# Patient Record
Sex: FEMALE | Race: WHITE | NOT HISPANIC OR LATINO | Employment: OTHER | URBAN - METROPOLITAN AREA
[De-identification: names, ages, dates, MRNs, and addresses within clinical notes are randomized per-mention and may not be internally consistent; named-entity substitution may affect disease eponyms.]

---

## 2021-08-26 ENCOUNTER — OFFICE VISIT (OUTPATIENT)
Dept: URGENT CARE | Facility: PHYSICIAN GROUP | Age: 36
End: 2021-08-26
Payer: COMMERCIAL

## 2021-08-26 VITALS
DIASTOLIC BLOOD PRESSURE: 78 MMHG | WEIGHT: 150 LBS | OXYGEN SATURATION: 97 % | BODY MASS INDEX: 27.6 KG/M2 | SYSTOLIC BLOOD PRESSURE: 110 MMHG | RESPIRATION RATE: 18 BRPM | HEIGHT: 62 IN | TEMPERATURE: 98.4 F | HEART RATE: 82 BPM

## 2021-08-26 DIAGNOSIS — M54.6 ACUTE BILATERAL THORACIC BACK PAIN: ICD-10-CM

## 2021-08-26 DIAGNOSIS — S16.1XXA NECK STRAIN, INITIAL ENCOUNTER: ICD-10-CM
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
## 2021-08-26 PROCEDURE — 99203 OFFICE O/P NEW LOW 30 MIN: CPT | Performed by: PHYSICIAN ASSISTANT

## 2021-08-26 RX ORDER — IBUPROFEN 800 MG/1
800 TABLET ORAL EVERY 8 HOURS PRN
Qty: 30 TABLET | Refills: 0 | Status: SHIPPED | OUTPATIENT
Start: 2021-08-26

## 2021-08-26 RX ORDER — CYCLOBENZAPRINE HCL 10 MG
10 TABLET ORAL 3 TIMES DAILY PRN
Qty: 14 TABLET | Refills: 0 | Status: SHIPPED | OUTPATIENT
Start: 2021-08-26 | End: 2021-09-04 | Stop reason: SDUPTHER
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
## 2021-09-04 ENCOUNTER — APPOINTMENT (OUTPATIENT)
Dept: RADIOLOGY | Facility: IMAGING CENTER | Age: 36
End: 2021-09-04
Attending: PHYSICIAN ASSISTANT
Payer: COMMERCIAL

## 2021-09-04 ENCOUNTER — OFFICE VISIT (OUTPATIENT)
Dept: URGENT CARE | Facility: PHYSICIAN GROUP | Age: 36
End: 2021-09-04
Payer: COMMERCIAL

## 2021-09-04 VITALS
BODY MASS INDEX: 27.6 KG/M2 | DIASTOLIC BLOOD PRESSURE: 70 MMHG | HEIGHT: 62 IN | SYSTOLIC BLOOD PRESSURE: 118 MMHG | WEIGHT: 150 LBS | TEMPERATURE: 98.7 F | HEART RATE: 70 BPM | RESPIRATION RATE: 18 BRPM | OXYGEN SATURATION: 98 %

## 2021-09-04 DIAGNOSIS — M54.6 ACUTE BILATERAL THORACIC BACK PAIN: ICD-10-CM
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
## 2021-09-04 DIAGNOSIS — S16.1XXA NECK STRAIN, INITIAL ENCOUNTER: ICD-10-CM

## 2021-09-04 PROCEDURE — 99214 OFFICE O/P EST MOD 30 MIN: CPT | Mod: 25 | Performed by: PHYSICIAN ASSISTANT

## 2021-09-04 PROCEDURE — 72070 X-RAY EXAM THORAC SPINE 2VWS: CPT | Mod: TC | Performed by: PHYSICIAN ASSISTANT

## 2021-09-04 PROCEDURE — 99999 PR NO CHARGE: CPT | Performed by: PHYSICIAN ASSISTANT

## 2021-09-04 PROCEDURE — 72040 X-RAY EXAM NECK SPINE 2-3 VW: CPT | Mod: TC | Performed by: PHYSICIAN ASSISTANT

## 2021-09-04 PROCEDURE — 72100 X-RAY EXAM L-S SPINE 2/3 VWS: CPT | Mod: TC | Performed by: PHYSICIAN ASSISTANT

## 2021-09-04 RX ORDER — CYCLOBENZAPRINE HCL 10 MG
10 TABLET ORAL 3 TIMES DAILY PRN
Qty: 14 TABLET | Refills: 0 | Status: SHIPPED | OUTPATIENT
Start: 2021-09-04

## 2021-09-04 RX ORDER — KETOROLAC TROMETHAMINE 30 MG/ML
30 INJECTION, SOLUTION INTRAMUSCULAR; INTRAVENOUS ONCE
Status: COMPLETED | OUTPATIENT
Start: 2021-09-04 | End: 2021-09-04

## 2021-09-04 RX ADMIN — KETOROLAC TROMETHAMINE 30 MG: 30 INJECTION, SOLUTION INTRAMUSCULAR; INTRAVENOUS at 10:44

## 2021-09-04 ASSESSMENT — ENCOUNTER SYMPTOMS
DIARRHEA: 0
ABDOMINAL PAIN: 0
CONSTIPATION: 0
CHILLS: 0
FEVER: 0
MYALGIAS: 0
BACK PAIN: 1
NECK PAIN: 1
SHORTNESS OF BREATH: 0
NAUSEA: 0
EYE PAIN: 0
HEADACHES: 0
COUGH: 0
VOMITING: 0
SORE THROAT: 0
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
## 2021-09-09 ENCOUNTER — HOSPITAL ENCOUNTER (OUTPATIENT)
Facility: MEDICAL CENTER | Age: 36
End: 2021-09-09
Attending: OBSTETRICS & GYNECOLOGY
Payer: COMMERCIAL

## 2021-09-09 ENCOUNTER — GYNECOLOGY VISIT (OUTPATIENT)
Dept: OBGYN | Facility: CLINIC | Age: 36
End: 2021-09-09
Payer: COMMERCIAL

## 2021-09-09 VITALS — DIASTOLIC BLOOD PRESSURE: 105 MMHG | BODY MASS INDEX: 27.25 KG/M2 | WEIGHT: 149 LBS | SYSTOLIC BLOOD PRESSURE: 145 MMHG

## 2021-09-09 DIAGNOSIS — Z00.00 ANNUAL PHYSICAL EXAM: ICD-10-CM

## 2021-09-09 DIAGNOSIS — Z12.4 CERVICAL CANCER SCREENING: ICD-10-CM

## 2021-09-09 DIAGNOSIS — E28.2 PCOS (POLYCYSTIC OVARIAN SYNDROME): ICD-10-CM

## 2021-09-09 DIAGNOSIS — E28.8 HYPERANDROGENISM: ICD-10-CM

## 2021-09-09 PROCEDURE — 87624 HPV HI-RISK TYP POOLED RSLT: CPT

## 2021-09-09 PROCEDURE — 88175 CYTOPATH C/V AUTO FLUID REDO: CPT

## 2021-09-09 PROCEDURE — 99385 PREV VISIT NEW AGE 18-39: CPT | Performed by: OBSTETRICS & GYNECOLOGY

## 2021-09-09 RX ORDER — ETONOGESTREL AND ETHINYL ESTRADIOL VAGINAL RING .015; .12 MG/D; MG/D
RING VAGINAL
Qty: 3 EACH | Refills: 4 | Status: SHIPPED | OUTPATIENT
Start: 2021-09-09 | End: 2022-11-11
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
## 2021-09-13 LAB
CYTOLOGY REG CYTOL: NORMAL
HPV HR 12 DNA CVX QL NAA+PROBE: NEGATIVE
HPV16 DNA SPEC QL NAA+PROBE: NEGATIVE
HPV18 DNA SPEC QL NAA+PROBE: NEGATIVE
SPECIMEN SOURCE: NORMAL

## 2022-08-29 ENCOUNTER — OFFICE VISIT (OUTPATIENT)
Dept: URGENT CARE | Facility: PHYSICIAN GROUP | Age: 37
End: 2022-08-29
Payer: COMMERCIAL

## 2022-08-29 ENCOUNTER — APPOINTMENT (OUTPATIENT)
Dept: RADIOLOGY | Facility: IMAGING CENTER | Age: 37
End: 2022-08-29
Attending: PHYSICIAN ASSISTANT
Payer: COMMERCIAL

## 2022-08-29 VITALS
TEMPERATURE: 98.4 F | BODY MASS INDEX: 28.52 KG/M2 | HEART RATE: 113 BPM | DIASTOLIC BLOOD PRESSURE: 84 MMHG | HEIGHT: 62 IN | SYSTOLIC BLOOD PRESSURE: 122 MMHG | RESPIRATION RATE: 16 BRPM | WEIGHT: 155 LBS | OXYGEN SATURATION: 98 %

## 2022-08-29 DIAGNOSIS — R05.9 COUGH: ICD-10-CM

## 2022-08-29 DIAGNOSIS — R09.89 CHEST CONGESTION: ICD-10-CM

## 2022-08-29 PROCEDURE — 71046 X-RAY EXAM CHEST 2 VIEWS: CPT | Mod: TC | Performed by: PHYSICIAN ASSISTANT

## 2022-08-29 PROCEDURE — 99213 OFFICE O/P EST LOW 20 MIN: CPT | Performed by: PHYSICIAN ASSISTANT

## 2022-08-29 RX ORDER — INFLUENZA A VIRUS A/IDAHO/07/2018 (H1N1) ANTIGEN (MDCK CELL DERIVED, PROPIOLACTONE INACTIVATED, INFLUENZA A VIRUS A/INDIANA/08/2018 (H3N2) ANTIGEN (MDCK CELL DERIVED, PROPIOLACTONE INACTIVATED), INFLUENZA B VIRUS B/SINGAPORE/INFTT-16-0610/2016 ANTIGEN (MDCK CELL DERIVED, PROPIOLACTONE INACTIVATED), INFLUENZA B VIRUS B/IOWA/06/2017 ANTIGEN (MDCK CELL DERIVED, PROPIOLACTONE INACTIVATED) 15; 15; 15; 15 UG/.5ML; UG/.5ML; UG/.5ML; UG/.5ML
INJECTION, SUSPENSION INTRAMUSCULAR
COMMUNITY
Start: 2021-09-03

## 2022-08-29 RX ORDER — BENZONATATE 100 MG/1
100 CAPSULE ORAL 3 TIMES DAILY PRN
Qty: 60 CAPSULE | Refills: 0 | Status: SHIPPED | OUTPATIENT
Start: 2022-08-29

## 2022-08-29 RX ORDER — PREDNISONE 10 MG/1
40 TABLET ORAL 2 TIMES DAILY
Qty: 40 TABLET | Refills: 0 | Status: SHIPPED | OUTPATIENT
Start: 2022-08-29 | End: 2022-09-03

## 2022-08-29 ASSESSMENT — ENCOUNTER SYMPTOMS
SPUTUM PRODUCTION: 1
EYE REDNESS: 0
VOMITING: 0
HEADACHES: 0
SINUS PAIN: 0
FEVER: 0
DIARRHEA: 0
WHEEZING: 0
CONSTIPATION: 0
EYE DISCHARGE: 0
EYE PAIN: 0
DIZZINESS: 0
SORE THROAT: 0
ABDOMINAL PAIN: 0
DIAPHORESIS: 0
SHORTNESS OF BREATH: 1
CHILLS: 0
COUGH: 1
NAUSEA: 0

## 2022-08-29 NOTE — PROGRESS NOTES
"  Subjective:     Josephine Marie  is a 37 y.o. female who presents for Other (Burning sensation in lungs x3/4 days, cough, had COVID x2 weeks ago. )       She presents today with lingering chest congestion, cough, shortness of breath, sputum production x2 weeks.  She is also experiencing difficulties with sleeping due to her symptoms as well as a constant sternal chest pain that is worsened with cough.  She did test positive for COVID 2 weeks ago and symptoms have been lingering since that time.  Denies fever/chills/sweats, nausea/vomiting, abdominal pain, diarrhea.  Taking over-the-counter medications for symptoms.      Review of Systems   Constitutional:  Negative for chills, diaphoresis, fever and malaise/fatigue.   HENT:  Negative for congestion, ear discharge, sinus pain and sore throat.    Eyes:  Negative for pain, discharge and redness.   Respiratory:  Positive for cough, sputum production and shortness of breath. Negative for wheezing.    Cardiovascular:  Positive for chest pain.   Gastrointestinal:  Negative for abdominal pain, constipation, diarrhea, nausea and vomiting.   Genitourinary:  Negative for dysuria, frequency and urgency.   Neurological:  Negative for dizziness and headaches.    No Known Allergies  Past Medical History:   Diagnosis Date    Anxiety     Depression     Migraine         Objective:   /84 (BP Location: Right arm, Patient Position: Sitting, BP Cuff Size: Adult)   Pulse (!) 113   Temp 36.9 °C (98.4 °F) (Temporal)   Resp 16   Ht 1.575 m (5' 2\")   Wt 70.3 kg (155 lb)   SpO2 98%   BMI 28.35 kg/m²   Physical Exam  Vitals and nursing note reviewed.   Constitutional:       General: She is not in acute distress.     Appearance: Normal appearance. She is not ill-appearing, toxic-appearing or diaphoretic.   HENT:      Head: Normocephalic.      Right Ear: Tympanic membrane, ear canal and external ear normal. There is no impacted cerumen.      Left Ear: Tympanic membrane, ear canal " and external ear normal. There is no impacted cerumen.      Nose: No congestion or rhinorrhea.      Mouth/Throat:      Mouth: Mucous membranes are moist.      Pharynx: No oropharyngeal exudate or posterior oropharyngeal erythema.   Eyes:      General:         Right eye: No discharge.         Left eye: No discharge.      Conjunctiva/sclera: Conjunctivae normal.   Cardiovascular:      Rate and Rhythm: Normal rate and regular rhythm.   Pulmonary:      Effort: Pulmonary effort is normal. No respiratory distress.      Breath sounds: Normal breath sounds. No stridor. No wheezing or rhonchi.   Musculoskeletal:      Cervical back: Neck supple.   Lymphadenopathy:      Cervical: No cervical adenopathy.   Neurological:      General: No focal deficit present.      Mental Status: She is alert and oriented to person, place, and time.   Psychiatric:         Mood and Affect: Mood normal.         Behavior: Behavior normal.         Thought Content: Thought content normal.         Judgment: Judgment normal.           Diagnostic testing:    Chest x-ray  Radiologist IMPRESSION:  No evidence of acute cardiopulmonary disease      Assessment/Plan:     Encounter Diagnoses   Name Primary?    Cough     Chest congestion           Plan for care for today's complaint includes prednisone for symptom support.  She should continue to monitor symptoms follow-up with primary care provider return urgent care if symptoms remain ongoing.  Follow-up in the emergency department if symptoms become severe.  Chest x-ray was negative for signs of pulmonary pathology/pneumonia.  Prescription for prednisone provided.    See AVS Instructions below for written guidance provided to patient on after-visit management and care in addition to our verbal discussion during the visit.    Please note that this dictation was created using voice recognition software. I have attempted to correct all errors, but there may be sound-alike, spelling, grammar and possibly content  errors that I did not discover before finalizing the note.    [place] [employer] NG

## 2024-06-06 ENCOUNTER — RESEARCH ENCOUNTER (OUTPATIENT)
Dept: RESEARCH | Facility: MEDICAL CENTER | Age: 39
End: 2024-06-06
Payer: COMMERCIAL

## 2024-06-26 NOTE — RESEARCH NOTE
Declined participating in SHERRI Study at this time but is willing to do it if we continue in the future

## 2024-07-12 ENCOUNTER — APPOINTMENT (OUTPATIENT)
Dept: OBGYN | Facility: CLINIC | Age: 39
End: 2024-07-12
Payer: COMMERCIAL

## 2024-08-02 ENCOUNTER — INITIAL PRENATAL (OUTPATIENT)
Dept: OBGYN | Facility: CLINIC | Age: 39
End: 2024-08-02
Payer: COMMERCIAL

## 2024-08-02 ENCOUNTER — HOSPITAL ENCOUNTER (OUTPATIENT)
Dept: LAB | Facility: MEDICAL CENTER | Age: 39
End: 2024-08-02
Attending: NURSE PRACTITIONER
Payer: COMMERCIAL

## 2024-08-02 ENCOUNTER — HOSPITAL ENCOUNTER (OUTPATIENT)
Facility: MEDICAL CENTER | Age: 39
End: 2024-08-02
Attending: NURSE PRACTITIONER
Payer: COMMERCIAL

## 2024-08-02 VITALS — BODY MASS INDEX: 28.31 KG/M2 | SYSTOLIC BLOOD PRESSURE: 132 MMHG | WEIGHT: 154.8 LBS | DIASTOLIC BLOOD PRESSURE: 74 MMHG

## 2024-08-02 DIAGNOSIS — O09.522 MULTIGRAVIDA OF ADVANCED MATERNAL AGE IN SECOND TRIMESTER: ICD-10-CM

## 2024-08-02 DIAGNOSIS — O09.512 SUPERVISION OF ELDERLY PRIMIGRAVIDA, SECOND TRIMESTER: ICD-10-CM

## 2024-08-02 PROBLEM — Z87.42 HISTORY OF PCOS: Status: ACTIVE | Noted: 2024-08-02

## 2024-08-02 PROBLEM — Z87.09 HISTORY OF ASTHMA: Status: ACTIVE | Noted: 2024-08-02

## 2024-08-02 PROBLEM — Z86.59 HISTORY OF ANXIETY: Status: ACTIVE | Noted: 2024-08-02

## 2024-08-02 LAB
ABO GROUP BLD: NORMAL
BLD GP AB SCN SERPL QL: NORMAL
ERYTHROCYTE [DISTWIDTH] IN BLOOD BY AUTOMATED COUNT: 41.2 FL (ref 35.9–50)
HBV SURFACE AG SER QL: NORMAL
HCT VFR BLD AUTO: 43.8 % (ref 37–47)
HCV AB SER QL: NORMAL
HGB BLD-MCNC: 14.8 G/DL (ref 12–16)
HIV 1+2 AB+HIV1 P24 AG SERPL QL IA: NORMAL
MCH RBC QN AUTO: 30.2 PG (ref 27–33)
MCHC RBC AUTO-ENTMCNC: 33.8 G/DL (ref 32.2–35.5)
MCV RBC AUTO: 89.4 FL (ref 81.4–97.8)
PLATELET # BLD AUTO: 311 K/UL (ref 164–446)
PMV BLD AUTO: 10.7 FL (ref 9–12.9)
RBC # BLD AUTO: 4.9 M/UL (ref 4.2–5.4)
RH BLD: NORMAL
RUBV AB SER QL: 43.7 IU/ML
T PALLIDUM AB SER QL IA: NORMAL
WBC # BLD AUTO: 10.6 K/UL (ref 4.8–10.8)

## 2024-08-02 PROCEDURE — 3075F SYST BP GE 130 - 139MM HG: CPT | Performed by: NURSE PRACTITIONER

## 2024-08-02 PROCEDURE — 87480 CANDIDA DNA DIR PROBE: CPT

## 2024-08-02 PROCEDURE — 87510 GARDNER VAG DNA DIR PROBE: CPT

## 2024-08-02 PROCEDURE — 86803 HEPATITIS C AB TEST: CPT

## 2024-08-02 PROCEDURE — 86900 BLOOD TYPING SEROLOGIC ABO: CPT

## 2024-08-02 PROCEDURE — 87660 TRICHOMONAS VAGIN DIR PROBE: CPT

## 2024-08-02 PROCEDURE — 87086 URINE CULTURE/COLONY COUNT: CPT

## 2024-08-02 PROCEDURE — 86780 TREPONEMA PALLIDUM: CPT

## 2024-08-02 PROCEDURE — 3078F DIAST BP <80 MM HG: CPT | Performed by: NURSE PRACTITIONER

## 2024-08-02 PROCEDURE — 87389 HIV-1 AG W/HIV-1&-2 AB AG IA: CPT

## 2024-08-02 PROCEDURE — 87491 CHLMYD TRACH DNA AMP PROBE: CPT

## 2024-08-02 PROCEDURE — 86901 BLOOD TYPING SEROLOGIC RH(D): CPT

## 2024-08-02 PROCEDURE — 87340 HEPATITIS B SURFACE AG IA: CPT

## 2024-08-02 PROCEDURE — 86850 RBC ANTIBODY SCREEN: CPT

## 2024-08-02 PROCEDURE — 86762 RUBELLA ANTIBODY: CPT

## 2024-08-02 PROCEDURE — 87591 N.GONORRHOEAE DNA AMP PROB: CPT

## 2024-08-02 PROCEDURE — 80307 DRUG TEST PRSMV CHEM ANLYZR: CPT

## 2024-08-02 PROCEDURE — 85027 COMPLETE CBC AUTOMATED: CPT

## 2024-08-02 PROCEDURE — 36415 COLL VENOUS BLD VENIPUNCTURE: CPT

## 2024-08-02 PROCEDURE — 0500F INITIAL PRENATAL CARE VISIT: CPT | Performed by: NURSE PRACTITIONER

## 2024-08-02 RX ORDER — ONDANSETRON 4 MG/1
4 TABLET, ORALLY DISINTEGRATING ORAL EVERY 6 HOURS PRN
Qty: 30 TABLET | Refills: 3 | Status: SHIPPED | OUTPATIENT
Start: 2024-08-02

## 2024-08-02 NOTE — PROGRESS NOTES
Subjective:   Josephine Marie is a 39 y.o.  who presents for her new OB exam.  She is 13w1d with an SALLY of Estimated Date of Delivery: 25 by LMP she was tracking c/w US. She is feeling well, does have N/V on and vomiting. Denies VB, LOF, contractions or pain. No ER visits or previous care in this pregnancy. Denies dysuria, vaginal DC, fever. Reports  fetal movement. Desires AFP.  Declines CF.  Desires NIPT testing.     Past Medical History:   Diagnosis Date    Migraine        Psych Hx: Reports hx of anxiety and depression but currently stable. Patient denies any history of PTSD, bipolar or any other psychological issues.     EPDS completed    Past Surgical History:   Procedure Laterality Date    DENTAL SURGERY      wisdom        OB History    Para Term  AB Living   1 0 0 0 0 0   SAB IAB Ectopic Molar Multiple Live Births   0 0 0 0 0 0      # Outcome Date GA Lbr Rodo/2nd Weight Sex Delivery Anes PTL Lv   1 Current                 Gynecological Hx: Denies any hx of STIs, including HSV. Denies any vulvovaginal disorders and no hx of abnormal cervical cytology. Last pap  WNL.    Sexual Hx: One current male partner, who is FOB     Contraceptive Hx: Has used pills, patch, ring in the past and has since discontinued use.     Family History   Problem Relation Age of Onset    Cancer Mother         cervical    Heart Disease Father     No Known Problems Sister     Heart Disease Maternal Grandmother     Obesity Maternal Grandmother     Heart Disease Maternal Grandfather      Denies any genetic disorders in family history.     Social History     Socioeconomic History    Marital status:      Spouse name: Not on file    Number of children: Not on file    Years of education: Not on file    Highest education level: Some college, no degree   Occupational History    Not on file   Tobacco Use    Smoking status: Never    Smokeless tobacco: Never   Vaping Use    Vaping status: Never Used   Substance and  Sexual Activity    Alcohol use: Not Currently     Comment: occ    Drug use: Not Currently     Types: Marijuana     Comment: occ, gummies x 1    Sexual activity: Yes     Partners: Male     Birth control/protection: Inserts, Ring     Comment: 3/2022   Other Topics Concern    Not on file   Social History Narrative    Not on file     Social Determinants of Health     Financial Resource Strain: Low Risk  (8/28/2022)    Overall Financial Resource Strain (CARDIA)     Difficulty of Paying Living Expenses: Not hard at all   Food Insecurity: No Food Insecurity (8/28/2022)    Hunger Vital Sign     Worried About Running Out of Food in the Last Year: Never true     Ran Out of Food in the Last Year: Never true   Transportation Needs: No Transportation Needs (8/28/2022)    PRAPARE - Transportation     Lack of Transportation (Medical): No     Lack of Transportation (Non-Medical): No   Physical Activity: Insufficiently Active (8/28/2022)    Exercise Vital Sign     Days of Exercise per Week: 2 days     Minutes of Exercise per Session: 20 min   Stress: Stress Concern Present (8/28/2022)    [employer] [place] [employer] - Occupational Stress Questionnaire     Feeling of Stress : To some extent   Social Connections: Socially Integrated (8/28/2022)    Social Connection and Isolation Panel [NHANES]     Frequency of Communication with Friends and Family: More than three times a week     Frequency of Social Gatherings with Friends and Family: Once a week     Attends Jain Services: More than 4 times per year     Active Member of Clubs or Organizations: Yes     Attends Club or Organization Meetings: More than 4 times per year     Marital Status:    Intimate Partner Violence: Not on file   Housing Stability: Low Risk  (8/28/2022)    Housing Stability Vital Sign     Unable to Pay for Housing in the Last Year: No     Number of Places Lived in the Last Year: 2     Unstable Housing in the Last Year: No       FOB is involved  and lives with Josephine Marei.  Pregnancy is planned.    She is currently working at [employer], denies any heavy lifting or exposure to potential teratogens like environmental or occupational toxins.   Denies alcohol use, drug use, or tobacco use in pregnancy.   Denies any current or hx of sexual, emotional or physical abuse or trauma.     Current Medications: PNV   Allergies: Denies allergies to medications, food, or environmental allergies    Objective:      Vitals:    08/02/24 0752   BP: 132/74   Weight: 154 lb 12.8 oz        See Prenatal Physical and Prenatal Vitals      Assessment:      1.  IUP @ 13w1d per LMP      2.  S=D      3.  See problem list as follows       Patient Active Problem List    Diagnosis Date Noted    History of anxiety and depression 08/02/2024    History of asthma 08/02/2024    History of PCOS 08/02/2024         Plan:   - GC/CT/vaginal pathogens; pap in media  - NIPT ordered, desires MFM referral for AMA  - Prenatal labs ordered - lab slip given  - Discussed PNV, nutrition, adequate water intake, and exercise/weight gain in pregnancy  - NOB informational packet with anticipatory guidance given  - Inappropriate for Center Pregnancy  - S/sx of pregnancy warning signs and PTL precautions given  - Complete OB US in 7 wks  - Return to [facility] in 4 wks

## 2024-08-02 NOTE — PROGRESS NOTES
Pt. Here for NOB visit.  # [phone] (home) [phone] (work)  Pt had U/S done on 7/2/2024  Last pap smear 9/9/2021 WNL  Pt. States having morning sickness, would like Zofran.

## 2024-08-03 LAB
C TRACH DNA GENITAL QL NAA+PROBE: NEGATIVE
CANDIDA DNA VAG QL PROBE+SIG AMP: NEGATIVE
G VAGINALIS DNA VAG QL PROBE+SIG AMP: NEGATIVE
N GONORRHOEA DNA GENITAL QL NAA+PROBE: NEGATIVE
SPECIMEN SOURCE: NORMAL
T VAGINALIS DNA VAG QL PROBE+SIG AMP: NEGATIVE

## 2024-08-04 LAB
AMPHET CTO UR CFM-MCNC: NEGATIVE NG/ML
BACTERIA UR CULT: NORMAL
BARBITURATES CTO UR CFM-MCNC: NEGATIVE NG/ML
BENZODIAZ CTO UR CFM-MCNC: NEGATIVE NG/ML
CANNABINOIDS CTO UR CFM-MCNC: NEGATIVE NG/ML
COCAINE CTO UR CFM-MCNC: NEGATIVE NG/ML
CREAT UR-MCNC: 87.7 MG/DL (ref 20–400)
DRUG COMMENT 753798: NORMAL
METHADONE CTO UR CFM-MCNC: NEGATIVE NG/ML
OPIATES CTO UR CFM-MCNC: NEGATIVE NG/ML
PCP CTO UR CFM-MCNC: NEGATIVE NG/ML
PROPOXYPH CTO UR CFM-MCNC: NEGATIVE NG/ML
SIGNIFICANT IND 70042: NORMAL
SITE SITE: NORMAL
SOURCE SOURCE: NORMAL

## 2024-08-05 ENCOUNTER — PATIENT MESSAGE (OUTPATIENT)
Dept: OBGYN | Facility: CLINIC | Age: 39
End: 2024-08-05
Payer: COMMERCIAL

## 2024-08-14 LAB
Lab: NORMAL
NTRA 1P36 DELETION SYNDROME POPULATION-BASED RISK TEXT: NORMAL
NTRA 1P36 DELETION SYNDROME RESULT TEXT: NORMAL
NTRA 1P36 DELETION SYNDROME RISK SCORE TEXT: NORMAL
NTRA 22Q11.2 DELETION SYNDROME POPULATION-BASED RISK TEXT: NORMAL
NTRA 22Q11.2 DELETION SYNDROME RESULT TEXT: NORMAL
NTRA 22Q11.2 DELETION SYNDROME RISK SCORE TEXT: NORMAL
NTRA ANGELMAN SYNDROME POPULATION-BASED RISK TEXT: NORMAL
NTRA ANGELMAN SYNDROME RESULT TEXT: NORMAL
NTRA ANGELMAN SYNDROME RISK SCORE TEXT: NORMAL
NTRA CRI-DU-CHAT SYNDROME POPULATION-BASED RISK TEXT: NORMAL
NTRA CRI-DU-CHAT SYNDROME RESULT TEXT: NORMAL
NTRA CRI-DU-CHAT SYNDROME RISK SCORE TEXT: NORMAL
NTRA FETAL FRACTION: NORMAL
NTRA GENDER OF FETUS: NORMAL
NTRA MONOSOMY X AGE-BASED RISK TEXT: NORMAL
NTRA MONOSOMY X RESULT TEXT: NORMAL
NTRA MONOSOMY X RISK SCORE TEXT: NORMAL
NTRA PRADER-WILLI SYNDROME POPULATION-BASED RISK TEXT: NORMAL
NTRA PRADER-WILLI SYNDROME RESULT TEXT: NORMAL
NTRA PRADER-WILLI SYNDROME RISK SCORE TEXT: NORMAL
NTRA TRIPLOIDY RESULT TEXT: NORMAL
NTRA TRISOMY 13 AGE-BASED RISK TEXT: NORMAL
NTRA TRISOMY 13 RESULT TEXT: NORMAL
NTRA TRISOMY 13 RISK SCORE TEXT: NORMAL
NTRA TRISOMY 18 AGE-BASED RISK TEXT: NORMAL
NTRA TRISOMY 18 RESULT TEXT: NORMAL
NTRA TRISOMY 18 RISK SCORE TEXT: NORMAL
NTRA TRISOMY 21 AGE-BASED RISK TEXT: NORMAL
NTRA TRISOMY 21 RESULT TEXT: NORMAL
NTRA TRISOMY 21 RISK SCORE TEXT: NORMAL

## 2024-08-15 DIAGNOSIS — Z34.81 ENCOUNTER FOR SUPERVISION OF OTHER NORMAL PREGNANCY, FIRST TRIMESTER: ICD-10-CM

## 2024-08-16 ENCOUNTER — TELEPHONE (OUTPATIENT)
Dept: OBGYN | Facility: CLINIC | Age: 39
End: 2024-08-16
Payer: COMMERCIAL

## 2024-08-16 NOTE — TELEPHONE ENCOUNTER
----- Message from Midwife Lula Garcia C.N.M. sent at 8/15/2024 10:54 PM PDT -----  Please notify patient that her NIPT was unable to be processed and needs to be recollected. I can reorder this now. She will need another test kit and the order. Thank you.    8/16/2024 0920  Called pt and notified as above. Pt verbalized understanding. Pt will come  new kit and order today. Informed pt that these 2 items will be at the  for her to . Pt verbalized understanding.

## 2024-08-30 ENCOUNTER — ROUTINE PRENATAL (OUTPATIENT)
Dept: OBGYN | Facility: CLINIC | Age: 39
End: 2024-08-30
Payer: COMMERCIAL

## 2024-08-30 VITALS — BODY MASS INDEX: 28.9 KG/M2 | DIASTOLIC BLOOD PRESSURE: 78 MMHG | WEIGHT: 158 LBS | SYSTOLIC BLOOD PRESSURE: 130 MMHG

## 2024-08-30 DIAGNOSIS — O09.512 PRIMIGRAVIDA OF ADVANCED MATERNAL AGE IN SECOND TRIMESTER: ICD-10-CM

## 2024-08-30 RX ORDER — DOCUSATE SODIUM 100 MG/1
100 CAPSULE, LIQUID FILLED ORAL 2 TIMES DAILY
Qty: 60 CAPSULE | Refills: 0 | Status: SHIPPED | OUTPATIENT
Start: 2024-08-30

## 2024-08-30 NOTE — PROGRESS NOTES
Pt. Here for OB/FU.   Reports feeling FM.   Chaperone offered.   Pt states she would like an rx for constipation just to have on hand.   Interested in AFP.   Phone/Pharm verified.

## 2024-08-30 NOTE — PROGRESS NOTES
SUBJECTIVE:  Pt is a 39 y.o.   at 17w1d  gestation. Presents today for follow-up prenatal care. Reports  fetal movement. Denies regular cramping/contractions, bleeding or leaking of fluid. Denies dysuria, headaches, N/V. Generally feels well today, her constipation is improving.     OBJECTIVE:  - See prenatal vitals flow  -   Vitals:    24 1315   BP: 130/78   Weight: 158 lb                 ASSESSMENT:   - IUP at 17w1d    - S=D   -   Patient Active Problem List    Diagnosis Date Noted    History of anxiety and depression 2024    History of asthma 2024    History of PCOS 2024    Primigravida of advanced maternal age in second trimester 2024         PLAN:  - S/sx pregnancy and labor warning signs vs general discomforts discussed  - Fetal movements and/or kick counts reviewed   - Adequate hydration reinforced  - Nutrition/exercise/vitamin education; continue PNV  - AFP for NTD ordered  - US scheduled   - Remedies for constipation reviewed   - Anticipatory guidance given  - RTC in 4 weeks for follow-up prenatal care

## 2024-09-12 ENCOUNTER — APPOINTMENT (OUTPATIENT)
Dept: OBGYN | Facility: CLINIC | Age: 39
End: 2024-09-12
Attending: OBSTETRICS & GYNECOLOGY
Payer: COMMERCIAL

## 2024-09-16 ENCOUNTER — ROUTINE PRENATAL (OUTPATIENT)
Dept: OBGYN | Facility: CLINIC | Age: 39
End: 2024-09-16
Payer: COMMERCIAL

## 2024-09-16 ENCOUNTER — HOSPITAL ENCOUNTER (OUTPATIENT)
Dept: LAB | Facility: MEDICAL CENTER | Age: 39
End: 2024-09-16
Attending: ADVANCED PRACTICE MIDWIFE
Payer: COMMERCIAL

## 2024-09-16 VITALS — WEIGHT: 159 LBS | DIASTOLIC BLOOD PRESSURE: 99 MMHG | SYSTOLIC BLOOD PRESSURE: 137 MMHG | BODY MASS INDEX: 29.08 KG/M2

## 2024-09-16 DIAGNOSIS — O09.522 SUPERVISION OF ELDERLY MULTIGRAVIDA IN SECOND TRIMESTER: ICD-10-CM

## 2024-09-16 DIAGNOSIS — R03.0 ELEVATED BLOOD-PRESSURE READING, WITHOUT DIAGNOSIS OF HYPERTENSION: ICD-10-CM

## 2024-09-16 DIAGNOSIS — K64.4 HEMORRHOIDAL SKIN TAGS: ICD-10-CM

## 2024-09-16 LAB
ALBUMIN SERPL BCP-MCNC: 3.8 G/DL (ref 3.2–4.9)
ALBUMIN/GLOB SERPL: 1.6 G/DL
ALP SERPL-CCNC: 67 U/L (ref 30–99)
ALT SERPL-CCNC: 16 U/L (ref 2–50)
ANION GAP SERPL CALC-SCNC: 16 MMOL/L (ref 7–16)
AST SERPL-CCNC: 21 U/L (ref 12–45)
BASOPHILS # BLD AUTO: 0.7 % (ref 0–1.8)
BASOPHILS # BLD: 0.08 K/UL (ref 0–0.12)
BILIRUB SERPL-MCNC: 0.2 MG/DL (ref 0.1–1.5)
BUN SERPL-MCNC: 7 MG/DL (ref 8–22)
CALCIUM ALBUM COR SERPL-MCNC: 8.9 MG/DL (ref 8.5–10.5)
CALCIUM SERPL-MCNC: 8.7 MG/DL (ref 8.5–10.5)
CHLORIDE SERPL-SCNC: 101 MMOL/L (ref 96–112)
CO2 SERPL-SCNC: 19 MMOL/L (ref 20–33)
CREAT SERPL-MCNC: 0.51 MG/DL (ref 0.5–1.4)
EOSINOPHIL # BLD AUTO: 0.13 K/UL (ref 0–0.51)
EOSINOPHIL NFR BLD: 1.1 % (ref 0–6.9)
ERYTHROCYTE [DISTWIDTH] IN BLOOD BY AUTOMATED COUNT: 43.9 FL (ref 35.9–50)
GFR SERPLBLD CREATININE-BSD FMLA CKD-EPI: 122 ML/MIN/1.73 M 2
GLOBULIN SER CALC-MCNC: 2.4 G/DL (ref 1.9–3.5)
GLUCOSE SERPL-MCNC: 75 MG/DL (ref 65–99)
HCT VFR BLD AUTO: 41.2 % (ref 37–47)
HGB BLD-MCNC: 13.5 G/DL (ref 12–16)
IMM GRANULOCYTES # BLD AUTO: 0.06 K/UL (ref 0–0.11)
IMM GRANULOCYTES NFR BLD AUTO: 0.5 % (ref 0–0.9)
LDH SERPL L TO P-CCNC: 252 U/L (ref 107–266)
LYMPHOCYTES # BLD AUTO: 2.62 K/UL (ref 1–4.8)
LYMPHOCYTES NFR BLD: 22 % (ref 22–41)
MCH RBC QN AUTO: 30.3 PG (ref 27–33)
MCHC RBC AUTO-ENTMCNC: 32.8 G/DL (ref 32.2–35.5)
MCV RBC AUTO: 92.4 FL (ref 81.4–97.8)
MONOCYTES # BLD AUTO: 0.79 K/UL (ref 0–0.85)
MONOCYTES NFR BLD AUTO: 6.6 % (ref 0–13.4)
NEUTROPHILS # BLD AUTO: 8.21 K/UL (ref 1.82–7.42)
NEUTROPHILS NFR BLD: 69.1 % (ref 44–72)
NRBC # BLD AUTO: 0 K/UL
NRBC BLD-RTO: 0 /100 WBC (ref 0–0.2)
PLATELET # BLD AUTO: 294 K/UL (ref 164–446)
PMV BLD AUTO: 10.6 FL (ref 9–12.9)
POTASSIUM SERPL-SCNC: 4.3 MMOL/L (ref 3.6–5.5)
PROT SERPL-MCNC: 6.2 G/DL (ref 6–8.2)
RBC # BLD AUTO: 4.46 M/UL (ref 4.2–5.4)
SODIUM SERPL-SCNC: 136 MMOL/L (ref 135–145)
URATE SERPL-MCNC: 3.3 MG/DL (ref 1.9–8.2)
WBC # BLD AUTO: 11.9 K/UL (ref 4.8–10.8)

## 2024-09-16 PROCEDURE — 80053 COMPREHEN METABOLIC PANEL: CPT

## 2024-09-16 PROCEDURE — 3075F SYST BP GE 130 - 139MM HG: CPT | Performed by: ADVANCED PRACTICE MIDWIFE

## 2024-09-16 PROCEDURE — 85025 COMPLETE CBC W/AUTO DIFF WBC: CPT

## 2024-09-16 PROCEDURE — 84156 ASSAY OF PROTEIN URINE: CPT

## 2024-09-16 PROCEDURE — 83615 LACTATE (LD) (LDH) ENZYME: CPT

## 2024-09-16 PROCEDURE — 0502F SUBSEQUENT PRENATAL CARE: CPT | Performed by: ADVANCED PRACTICE MIDWIFE

## 2024-09-16 PROCEDURE — 3080F DIAST BP >= 90 MM HG: CPT | Performed by: ADVANCED PRACTICE MIDWIFE

## 2024-09-16 PROCEDURE — 84550 ASSAY OF BLOOD/URIC ACID: CPT

## 2024-09-16 PROCEDURE — 36415 COLL VENOUS BLD VENIPUNCTURE: CPT

## 2024-09-16 PROCEDURE — 82570 ASSAY OF URINE CREATININE: CPT

## 2024-09-16 RX ORDER — ASPIRIN 81 MG/1
81 TABLET ORAL DAILY
COMMUNITY
Start: 2024-09-16

## 2024-09-16 NOTE — PROGRESS NOTES
Pt is a 39 y.o.   at 19w4d  gestation here for MICHAEL visit. She reports possible fetal movement. Denies vaginal bleeding and denies cramping/regular contractions. She reports overall today she is feeling well. No recent ER visits since last seen. See below. Adequate hydration reviewed in detail with patient. Precautions and warning signs reviewed with patient.  RTC in 4 week(s) for MICHAEL visit.     Hemorrhoid  Discussed the pendulous nature of this and likely only banding or surgical removal would be appropriate. At this time, refer to general surgery.     Elevated blood pressure without diagnosis of hypertension  Discussed starting aspirin therapy daily. PI labs today.     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 She reports long history of hypermobility in joints and this was noted when she was younfer. Never had formal work up, has never been on medication.

## 2024-09-16 NOTE — PROGRESS NOTES
Pt here today for OB follow up  Pt states she would like to discuss her pile prolapse   Reports +  Ander # [phone]  Pharmacy Confirmed.  Chaperone offered and not indicated.   Pt has an u/s scheduled on 9/23/2024  Pt states she complete AFP

## 2024-09-17 LAB
CREAT UR-MCNC: 52.8 MG/DL
PROT UR-MCNC: 6.8 MG/DL (ref 0–15)
PROT/CREAT UR: 129 MG/G (ref 10–107)

## 2024-09-23 ENCOUNTER — ANCILLARY PROCEDURE (OUTPATIENT)
Dept: MATERNAL FETAL MEDICINE | Facility: MEDICAL CENTER | Age: 39
End: 2024-09-23
Payer: COMMERCIAL

## 2024-09-23 VITALS
WEIGHT: 158.8 LBS | DIASTOLIC BLOOD PRESSURE: 89 MMHG | BODY MASS INDEX: 29.04 KG/M2 | SYSTOLIC BLOOD PRESSURE: 133 MMHG | HEART RATE: 89 BPM

## 2024-09-23 DIAGNOSIS — O09.522 MULTIGRAVIDA OF ADVANCED MATERNAL AGE IN SECOND TRIMESTER: ICD-10-CM

## 2024-09-23 DIAGNOSIS — M35.7 HYPERMOBILITY SYNDROME: ICD-10-CM

## 2024-09-23 DIAGNOSIS — O09.512 AMA (ADVANCED MATERNAL AGE) PRIMIGRAVIDA 35+, SECOND TRIMESTER: ICD-10-CM

## 2024-09-23 DIAGNOSIS — Z3A.20 20 WEEKS GESTATION OF PREGNANCY: ICD-10-CM

## 2024-09-23 PROCEDURE — 76817 TRANSVAGINAL US OBSTETRIC: CPT | Performed by: OBSTETRICS & GYNECOLOGY

## 2024-09-23 PROCEDURE — 76811 OB US DETAILED SNGL FETUS: CPT | Performed by: OBSTETRICS & GYNECOLOGY

## 2024-09-23 PROCEDURE — 99202 OFFICE O/P NEW SF 15 MIN: CPT | Performed by: OBSTETRICS & GYNECOLOGY

## 2024-09-23 ASSESSMENT — FIBROSIS 4 INDEX: FIB4 SCORE: .6964285714285714286

## 2024-09-24 ENCOUNTER — TELEPHONE (OUTPATIENT)
Dept: MATERNAL FETAL MEDICINE | Facility: MEDICAL CENTER | Age: 39
End: 2024-09-24
Payer: COMMERCIAL

## 2024-09-24 NOTE — TELEPHONE ENCOUNTER
"Pt called in stating cervical pain since her anatomy scan+transvaginal U/S yesterday. Pt stated no spotting or bleeding, just \"severe pain that has persisted since yesterday's appt, with little to no change.\" Reviewed with Dr. Doyle, who stated that as the TVUS is a routine part of the examination, nothing was done that should cause her any pain, and there's nothing in the images that he sees that would indicate a reason for her pain. For this reason, we advise for the pt to follow up with her primary OB to have her cervix checked if necessary, or their office can contact us for advice if needed. Advised pt to give us a call back if directed or if new symptoms emerge. Pt verbalized understanding and had no further questions at this time  "

## 2024-09-25 ENCOUNTER — HOSPITAL ENCOUNTER (OUTPATIENT)
Dept: LAB | Facility: MEDICAL CENTER | Age: 39
End: 2024-09-25
Attending: NURSE PRACTITIONER
Payer: COMMERCIAL

## 2024-09-25 DIAGNOSIS — O09.512 PRIMIGRAVIDA OF ADVANCED MATERNAL AGE IN SECOND TRIMESTER: ICD-10-CM

## 2024-09-25 PROCEDURE — 82105 ALPHA-FETOPROTEIN SERUM: CPT

## 2024-09-25 PROCEDURE — 36415 COLL VENOUS BLD VENIPUNCTURE: CPT

## 2024-10-02 LAB
# FETUSES US: NORMAL
AFP MOM SERPL: 1.98
AFP SERPL-MCNC: 130 NG/ML
AGE - REPORTED: 39.6 YR
CURRENT SMOKER: NO
FAMILY MEMBER DISEASES HX: NO
GA METHOD: NORMAL
GA: NORMAL WK
IDDM PATIENT QL: NO
INTEGRATED SCN PATIENT-IMP: NORMAL
SPECIMEN DRAWN SERPL: NORMAL

## 2024-10-03 ENCOUNTER — ROUTINE PRENATAL (OUTPATIENT)
Dept: OBGYN | Facility: CLINIC | Age: 39
End: 2024-10-03
Payer: COMMERCIAL

## 2024-10-03 VITALS — SYSTOLIC BLOOD PRESSURE: 145 MMHG | DIASTOLIC BLOOD PRESSURE: 88 MMHG | WEIGHT: 160.6 LBS | BODY MASS INDEX: 29.37 KG/M2

## 2024-10-03 DIAGNOSIS — Z3A.22 22 WEEKS GESTATION OF PREGNANCY: ICD-10-CM

## 2024-10-03 DIAGNOSIS — Z83.49 FAMILY HISTORY OF MTHFR DEFICIENCY: ICD-10-CM

## 2024-10-03 DIAGNOSIS — R03.0 ELEVATED BLOOD-PRESSURE READING, WITHOUT DIAGNOSIS OF HYPERTENSION: ICD-10-CM

## 2024-10-03 DIAGNOSIS — Q79.60 EHLERS-DANLOS SYNDROME: ICD-10-CM

## 2024-10-03 DIAGNOSIS — M24.9 HYPERMOBILE JOINTS: ICD-10-CM

## 2024-10-03 DIAGNOSIS — O09.522 SUPERVISION OF ELDERLY MULTIGRAVIDA IN SECOND TRIMESTER: ICD-10-CM

## 2024-10-03 PROCEDURE — 3077F SYST BP >= 140 MM HG: CPT | Performed by: OBSTETRICS & GYNECOLOGY

## 2024-10-03 PROCEDURE — 3079F DIAST BP 80-89 MM HG: CPT | Performed by: OBSTETRICS & GYNECOLOGY

## 2024-10-03 PROCEDURE — 0502F SUBSEQUENT PRENATAL CARE: CPT | Performed by: OBSTETRICS & GYNECOLOGY

## 2024-10-03 ASSESSMENT — FIBROSIS 4 INDEX: FIB4 SCORE: .6964285714285714286

## 2024-10-17 ENCOUNTER — HOSPITAL ENCOUNTER (OUTPATIENT)
Dept: LAB | Facility: MEDICAL CENTER | Age: 39
End: 2024-10-17
Attending: OBSTETRICS & GYNECOLOGY
Payer: COMMERCIAL

## 2024-10-17 DIAGNOSIS — Q79.60 EHLERS-DANLOS SYNDROME: ICD-10-CM

## 2024-10-17 PROCEDURE — 81220 CFTR GENE COM VARIANTS: CPT

## 2024-10-17 PROCEDURE — 81243 FMR1 GEN ALY DETC ABNL ALLEL: CPT

## 2024-10-17 PROCEDURE — 81408 MOPATH PROCEDURE LEVEL 9: CPT

## 2024-10-17 PROCEDURE — 81329 SMN1 GENE DOS/DELETION ALYS: CPT

## 2024-10-17 PROCEDURE — 36415 COLL VENOUS BLD VENIPUNCTURE: CPT

## 2024-10-17 PROCEDURE — 81291 MTHFR GENE: CPT

## 2024-10-23 LAB
MTHFR C.1298A>C GENO BLD/T: NORMAL
MTHFR C.677C>T GENO BLD/T: NEGATIVE
MTHFR GENE MUT ANL BLD/T: NORMAL

## 2024-11-04 LAB — TEST NAME 95000: NORMAL

## 2024-11-05 ENCOUNTER — APPOINTMENT (OUTPATIENT)
Dept: OBGYN | Facility: CLINIC | Age: 39
End: 2024-11-05
Payer: COMMERCIAL

## 2024-11-05 VITALS — WEIGHT: 166 LBS | SYSTOLIC BLOOD PRESSURE: 139 MMHG | DIASTOLIC BLOOD PRESSURE: 95 MMHG | BODY MASS INDEX: 30.36 KG/M2

## 2024-11-05 DIAGNOSIS — M25.552 BILATERAL HIP PAIN: ICD-10-CM

## 2024-11-05 DIAGNOSIS — O13.3 GESTATIONAL HYPERTENSION, THIRD TRIMESTER: ICD-10-CM

## 2024-11-05 DIAGNOSIS — M25.551 BILATERAL HIP PAIN: ICD-10-CM

## 2024-11-05 DIAGNOSIS — O09.522 SUPERVISION OF ELDERLY MULTIGRAVIDA IN SECOND TRIMESTER: ICD-10-CM

## 2024-11-05 PROCEDURE — 90656 IIV3 VACC NO PRSV 0.5 ML IM: CPT | Performed by: PHYSICIAN ASSISTANT

## 2024-11-05 PROCEDURE — 90471 IMMUNIZATION ADMIN: CPT | Performed by: PHYSICIAN ASSISTANT

## 2024-11-05 PROCEDURE — 0502F SUBSEQUENT PRENATAL CARE: CPT | Performed by: PHYSICIAN ASSISTANT

## 2024-11-05 ASSESSMENT — FIBROSIS 4 INDEX: FIB4 SCORE: .6964285714285714286

## 2024-11-05 NOTE — PROGRESS NOTES
Pt. Here for OB/FU.   Reports Good FM.   Pt. Denies VB, LOF, or UC's.   Pt states she would like to get temporary disabled plaque, is having hip pain and acid reflux is getting worse.    Phone/Pharm verified.

## 2024-11-05 NOTE — PROGRESS NOTES
S: 39 y.o.  at 26w5d presents for routine obstetric follow-up.   Good fetal movement.  No contractions, vaginal bleeding, or leakage of fluid.    Questions answered.    BP has been elevated on prior visits. Tracking home BP with intermittently elevated readings. Did have one home reading of 168/98 awhile ago but otherwise elevated readings are in low 140s/90s. Has not brought BP cuff to clinic to calibrate. Reports new onset migraine today. Hx of migraines with similar sx but none so far this pregnancy. States she thinks she slept weird causing the migraine.     Pt also in process of workup for Andrade Danlos Syndrome, follows with MFM. Pt c/o bilateral hip pain.     O: BP (!) 139/95, 140/82   Wt 166 lb   LMP 2024 (Exact Date)   BMI 30.36 kg/m²   Patients' weight gain, fluid intake and exercise level discussed.  Vitals, fundal height , fetal position, and FHR reviewed on flowsheet    A/P:  39 y.o.  at 26w5d presents for routine obstetric follow-up.  Size equals dates and/or scan    - BP elevated, pt meets criteria for Gestational Hypertension. Taking ASA 81mg, PIH labs ordered due to new onset HA. Advised she will likely need medication if BP continues to be elevated. Will bring cuff to next visit to calibrate. Discussed likely delivery timing. Strict Pre-e precautions given.   - Referral to PT for hip pain and migraines.   - Flu given today   - Continue prenatal vitamins- pills not gummies.  - Exercise at least 30 minutes daily. Drink at least 3-4L of water daily  - PTL precautions educated.    Follow-up in 2 weeks.    Angela Phan P.A.-C.  [facility]

## 2024-11-06 LAB — UNCODED TEST RESULT 95040: NORMAL

## 2024-11-08 ENCOUNTER — TELEPHONE (OUTPATIENT)
Dept: OBGYN | Facility: CLINIC | Age: 39
End: 2024-11-08
Payer: COMMERCIAL

## 2024-11-08 DIAGNOSIS — Z31.430 ENCOUNTER OF FEMALE FOR TESTING FOR GENETIC DISEASE CARRIER STATUS FOR PROCREATIVE MANAGEMENT: ICD-10-CM

## 2024-11-08 NOTE — TELEPHONE ENCOUNTER
Pt advised that we have been in contact with our [employer] rep and the order for the EDS and other genetic screening tests is ready for her to  .Pt will be given order forms and kit to me taken to lab for blood draw.ANAHI

## 2024-11-11 ENCOUNTER — HOSPITAL ENCOUNTER (OUTPATIENT)
Dept: LAB | Facility: MEDICAL CENTER | Age: 39
End: 2024-11-11
Attending: PHYSICIAN ASSISTANT
Payer: COMMERCIAL

## 2024-11-11 DIAGNOSIS — O09.522 SUPERVISION OF ELDERLY MULTIGRAVIDA IN SECOND TRIMESTER: ICD-10-CM

## 2024-11-11 DIAGNOSIS — O13.3 GESTATIONAL HYPERTENSION, THIRD TRIMESTER: ICD-10-CM

## 2024-11-11 LAB
ALT SERPL-CCNC: 19 U/L (ref 2–50)
AST SERPL-CCNC: 20 U/L (ref 12–45)
BUN SERPL-MCNC: 6 MG/DL (ref 8–22)
CREAT SERPL-MCNC: 0.49 MG/DL (ref 0.5–1.4)
ERYTHROCYTE [DISTWIDTH] IN BLOOD BY AUTOMATED COUNT: 45.1 FL (ref 35.9–50)
GFR SERPLBLD CREATININE-BSD FMLA CKD-EPI: 123 ML/MIN/1.73 M 2
GLUCOSE 1H P 50 G GLC PO SERPL-MCNC: 140 MG/DL (ref 70–139)
HCT VFR BLD AUTO: 39 % (ref 37–47)
HGB BLD-MCNC: 12.1 G/DL (ref 12–16)
MCH RBC QN AUTO: 29.2 PG (ref 27–33)
MCHC RBC AUTO-ENTMCNC: 31 G/DL (ref 32.2–35.5)
MCV RBC AUTO: 94 FL (ref 81.4–97.8)
PLATELET # BLD AUTO: 282 K/UL (ref 164–446)
PMV BLD AUTO: 10.4 FL (ref 9–12.9)
RBC # BLD AUTO: 4.15 M/UL (ref 4.2–5.4)
T PALLIDUM AB SER QL IA: NONREACTIVE
WBC # BLD AUTO: 11.4 K/UL (ref 4.8–10.8)

## 2024-11-11 PROCEDURE — 36415 COLL VENOUS BLD VENIPUNCTURE: CPT

## 2024-11-11 PROCEDURE — 82950 GLUCOSE TEST: CPT

## 2024-11-11 PROCEDURE — 84156 ASSAY OF PROTEIN URINE: CPT

## 2024-11-11 PROCEDURE — 84520 ASSAY OF UREA NITROGEN: CPT

## 2024-11-11 PROCEDURE — 86780 TREPONEMA PALLIDUM: CPT

## 2024-11-11 PROCEDURE — 84460 ALANINE AMINO (ALT) (SGPT): CPT

## 2024-11-11 PROCEDURE — 85027 COMPLETE CBC AUTOMATED: CPT

## 2024-11-11 PROCEDURE — 82565 ASSAY OF CREATININE: CPT

## 2024-11-11 PROCEDURE — 82570 ASSAY OF URINE CREATININE: CPT

## 2024-11-11 PROCEDURE — 84450 TRANSFERASE (AST) (SGOT): CPT

## 2024-11-12 LAB
CREAT UR-MCNC: 64.7 MG/DL
PROT UR-MCNC: 10.9 MG/DL (ref 0–15)
PROT/CREAT UR: 168 MG/G (ref 10–107)

## 2024-11-14 ENCOUNTER — ROUTINE PRENATAL (OUTPATIENT)
Dept: OBGYN | Facility: CLINIC | Age: 39
End: 2024-11-14
Payer: COMMERCIAL

## 2024-11-14 VITALS — BODY MASS INDEX: 30.18 KG/M2 | SYSTOLIC BLOOD PRESSURE: 139 MMHG | DIASTOLIC BLOOD PRESSURE: 90 MMHG | WEIGHT: 165 LBS

## 2024-11-14 DIAGNOSIS — O13.3 GESTATIONAL HYPERTENSION, THIRD TRIMESTER: ICD-10-CM

## 2024-11-14 DIAGNOSIS — R73.09 ABNORMAL GTT (GLUCOSE TOLERANCE TEST): ICD-10-CM

## 2024-11-14 PROCEDURE — 90715 TDAP VACCINE 7 YRS/> IM: CPT | Mod: JZ | Performed by: OBSTETRICS & GYNECOLOGY

## 2024-11-14 PROCEDURE — 0502F SUBSEQUENT PRENATAL CARE: CPT | Performed by: OBSTETRICS & GYNECOLOGY

## 2024-11-14 PROCEDURE — 90471 IMMUNIZATION ADMIN: CPT | Performed by: OBSTETRICS & GYNECOLOGY

## 2024-11-14 RX ORDER — LABETALOL 100 MG/1
100 TABLET, FILM COATED ORAL 2 TIMES DAILY
Qty: 60 TABLET | Refills: 2 | Status: SHIPPED | OUTPATIENT
Start: 2024-11-14

## 2024-11-14 ASSESSMENT — FIBROSIS 4 INDEX: FIB4 SCORE: 0.63

## 2024-11-14 NOTE — PROGRESS NOTES
OB Followup;    28w0d    Patient Active Problem List    Diagnosis Date Noted    Gestational hypertension, third trimester 2024    History of anxiety and depression 2024    History of asthma 2024    History of PCOS 2024    Primigravida of advanced maternal age in second trimester 2024       Vitals:    24 0955   Weight: 165 lb   Blood pressure 139/90    Patient presents for followup of OB care. Currently doing well . Good fetal movement no leakage of fluid no contractions or vaginal bleeding        Size equals dates, normal fetal heart rate      Labs; PIH labs performed and are normal 1 hour Glucola 140-abnormal-needs 3-hour GTT lab slip given to patient  Patient does home blood pressure monitoring and she showed me her levels they run anywhere from  118-140 systolic and 80s to 90 diastolic    Will start labetalol 100 mg p.o. twice daily prescription sent to pharmacy    Tdap today    RSV vaccination 32-0/7 weeks - 35-6/7 weeks      Labor precautions given  Discussed proper weight gain during pregnancy.    Signs and symptoms of labor/ labor discussed   Discussed proper exercise during pregnancy  Discussed proper oral fluid hydration  Reviewed fetal kick counts and appropriate fetal movement during pregnancy  Reviewed postpartum birth control methods  All questions answered in detail    Followup in 1 weeks

## 2024-11-14 NOTE — NON-PROVIDER
Pt here today for OB follow up  Pt states no concerns  Samir sheet given   Tdap - Today  Flu already given elsewhere  Reports +FM  Good # [phone]   Pharmacy Confirmed.  Chaperone offered and provided .

## 2024-11-14 NOTE — LETTER
"Count Your Baby's Movements  Another step to a healthy delivery    Josephine Jaquez Marie  [facility]  Dept: [phone]    How Many Weeks Pregnant? 28w0d    Date to Begin Countin2024              How to use this chart    One way for your physician to keep track of your baby's health is by knowing how often the baby moves (or \"kicks\") in your womb.  You can help your physician to do this by using this chart every day.    Every day, you should see how many hours it takes for your baby to move 10 times.  Start in the morning, as soon as you get up.    First, write down the time your baby moves until you get to 10.  Check off one box every time your baby moves until you get to 10.  Write down the time you finished counting in the last column.  Total how long it took to count up all 10 movements.  Finally, fill in the box that shows how long this took.  After counting 10 movements, you no longer have to count any more that day.  The next morning, just start counting again as soon as you get up.    What should you call a \"movement\"?  It is hard to say, because it will feel different from one mother to another and from one pregnancy to the next.  The important thing is that you count the movements the same way throughout your pregnancy.  If you have more questions, you should ask your physician.    Count carefully every day!  SAMPLE:  Week 28    How many hours did it take to feel 10 movements?       Start  Time     1     2     3     4     5     6     7     8     9     10   Finish Time   Mon 8:20           11:40                  Fri               Sat               Sun                 IMPORTANT: You should contact your physician if it takes more than two hours for you to feel 10 movements.  Each morning, write down the time and start to count the movements of your baby.  Keep track by checking off one box every time you feel one movement.  When you " "have felt 10 \"kicks\", write down the time you finished counting in the last column.  Then fill in the   box (over the check sal) for the number of hours it took.  Be sure to read the complete instructions on the previous page.          "

## 2024-11-18 ENCOUNTER — HOSPITAL ENCOUNTER (OUTPATIENT)
Dept: LAB | Facility: MEDICAL CENTER | Age: 39
End: 2024-11-18
Attending: OBSTETRICS & GYNECOLOGY
Payer: COMMERCIAL

## 2024-11-18 DIAGNOSIS — O13.3 GESTATIONAL HYPERTENSION, THIRD TRIMESTER: ICD-10-CM

## 2024-11-18 LAB
GLUCOSE 1H P CHAL SERPL-MCNC: 219 MG/DL (ref 65–180)
GLUCOSE 2H P CHAL SERPL-MCNC: 189 MG/DL (ref 65–155)
GLUCOSE 3H P CHAL SERPL-MCNC: 99 MG/DL (ref 65–140)
GLUCOSE BS SERPL-MCNC: 70 MG/DL (ref 65–95)

## 2024-11-18 PROCEDURE — 82951 GLUCOSE TOLERANCE TEST (GTT): CPT

## 2024-11-18 PROCEDURE — 82952 GTT-ADDED SAMPLES: CPT

## 2024-11-18 PROCEDURE — 36415 COLL VENOUS BLD VENIPUNCTURE: CPT

## 2024-11-19 LAB
Lab: NEGATIVE
Lab: NORMAL
NTRA ABETALIPOPROTEINEMIA: NEGATIVE
NTRA ACUTE INFANTILE LIVER FAILURE: NEGATIVE
NTRA ADRENOLEUKODYSTROPHY: NEGATIVE
NTRA ASPARAGINE SYNTHETASE DEFICIENCY: NEGATIVE
NTRA ATAXIA-TELANGIECTASIA: NEGATIVE
NTRA BETA-HEMOGLOBINOPATHIES: NEGATIVE
NTRA BLOOM SYNDROME: NEGATIVE
NTRA CANAVAN DISEASE: NEGATIVE
NTRA CARNITINE PALMITOYLTRANSFERASE II DEFICIENCY: NEGATIVE
NTRA CEREBROTENDINOUS XANTHOMATOSIS: NEGATIVE
NTRA CHOREOACANTHOCYTOSIS: NEGATIVE
NTRA CONGENITAL AMEGAKARYOCYTIC THROMBOCYTOPENIA: NEGATIVE
NTRA CONGENITAL INSENSIVITY TO PAIN WITH ANHIDROSIS (CIPA): NEGATIVE
NTRA CORTICOSTERONE METHYLOXIDASE DEFICIENCY: NEGATIVE
NTRA CYSTIC FIBROSIS: NEGATIVE
NTRA CYSTINOSIS: NEGATIVE
NTRA DUCHENNE/BECKER MUSCULAR DYSTROPHY: NEGATIVE
NTRA DYSKERATOSIS CONGENITA: NEGATIVE
NTRA ENHANCED S-CONE SYNDROME (GOLDMANN-FAVRE SYNDROME): NEGATIVE
NTRA FACTOR XI DEFICIENCY: NEGATIVE
NTRA FAMILIAL DYSAUTONOMIA: NEGATIVE
NTRA FAMILIAL HYPERINSULINISM: NEGATIVE
NTRA FAMILIAL MEDITERRANEAN FEVER: NEGATIVE
NTRA FRAGILE X SYNDROME: NEGATIVE
NTRA GALACTOSEMIA: NEGATIVE
NTRA GAUCHER DISEASE: NEGATIVE
NTRA HOMOCYSTINURIA DUE TO DEFICIENCY OF MTHFR: NEGATIVE
NTRA INCLUSION BODY MYOPATHY 2: NEGATIVE
NTRA INFANTILE CEREBRAL AND CEREBELLAR ATROPHY: NEGATIVE
NTRA ISOVALERIC ACIDEMIA: NEGATIVE
NTRA JOUBERT SYNDROME 2: NEGATIVE
NTRA LEBER CONGENITAL AMAUROSIS 2 (RETINITIS PIGMENTOSA 20): NEGATIVE
NTRA LIPOAMIDE DEHYDROGENASE DEFICIENCY: NEGATIVE
NTRA MEDIUM CHAIN ACYL-COA DEHYDROGENASE DEFICIENCY: NEGATIVE
NTRA MEGALENCEPHALIC LEUKOENCEPHALOPATHY WITH SUBCORTICAL CYSTS: NEGATIVE
NTRA MICROPHTHALMIA/ANOPHTHALMIA: NEGATIVE
NTRA MITOCHONDRIAL MYOPATHY AND SIDEROBLASTIC ANEMIA (MLAS1): NEGATIVE
NTRA MULTIPLE SULPHATASE DEFICIENCY: NEGATIVE
NTRA MYONEUROGASTROINTESTINAL ENCEPHALOPATHY (MNGIE): NEGATIVE
NTRA NEMALINE MYOPATHY: NEGATIVE
NTRA OMENN SYNDROME: NEGATIVE
NTRA ORNITHINE AMINOTRANSFERASE DEFICIENCY: NEGATIVE
NTRA PHENYLKETONURIA: NEGATIVE
NTRA PROGRESSIVE CEREBELLO-CEREBRAL ATROPHY: NEGATIVE
NTRA RETINITIS PIGMENTOSA 25: NEGATIVE
NTRA RETINITIS PIGMENTOSA 26: NEGATIVE
NTRA RETINITIS PIGMENTOSA 28: NEGATIVE
NTRA RETINITIS PIGMENTOSA 59: NEGATIVE
NTRA SMITH-LEMLI-OPITZ SYNDROME: NEGATIVE
NTRA SPINAL MUSCULAR ATROPHY: NEGATIVE
NTRA TAY-SACHS DISEASE: NEGATIVE
NTRA WALKER-WARBURG SYNDROME: NEGATIVE
NTRA WILSON DISEASE: NEGATIVE
NTRA WOLMAN DISEASE: NEGATIVE

## 2024-11-21 ENCOUNTER — ROUTINE PRENATAL (OUTPATIENT)
Dept: OBGYN | Facility: CLINIC | Age: 39
End: 2024-11-21
Payer: COMMERCIAL

## 2024-11-21 VITALS — SYSTOLIC BLOOD PRESSURE: 126 MMHG | BODY MASS INDEX: 30.18 KG/M2 | DIASTOLIC BLOOD PRESSURE: 83 MMHG | WEIGHT: 165 LBS

## 2024-11-21 DIAGNOSIS — O24.410 GDM, CLASS A1: ICD-10-CM

## 2024-11-21 DIAGNOSIS — R73.09 ABNORMAL GTT (GLUCOSE TOLERANCE TEST): ICD-10-CM

## 2024-11-21 PROCEDURE — 3079F DIAST BP 80-89 MM HG: CPT | Performed by: OBSTETRICS & GYNECOLOGY

## 2024-11-21 PROCEDURE — 0502F SUBSEQUENT PRENATAL CARE: CPT | Performed by: OBSTETRICS & GYNECOLOGY

## 2024-11-21 PROCEDURE — 3074F SYST BP LT 130 MM HG: CPT | Performed by: OBSTETRICS & GYNECOLOGY

## 2024-11-21 ASSESSMENT — FIBROSIS 4 INDEX: FIB4 SCORE: 0.63

## 2024-11-21 NOTE — NON-PROVIDER
Pt here today for OB follow up  Pt states no concerns  Tdap - already given   STEPHANIE sheet given   Reports +FM  Good # [phone]   Pharmacy Confirmed.

## 2024-11-21 NOTE — LETTER
"Count Your Baby's Movements  Another step to a healthy delivery    Josephine Jaquez Marie  [facility]  Dept: [phone]    How Many Weeks Pregnant? 29w0d    Date to Begin Countin2024              How to use this chart    One way for your physician to keep track of your baby's health is by knowing how often the baby moves (or \"kicks\") in your womb.  You can help your physician to do this by using this chart every day.    Every day, you should see how many hours it takes for your baby to move 10 times.  Start in the morning, as soon as you get up.    First, write down the time your baby moves until you get to 10.  Check off one box every time your baby moves until you get to 10.  Write down the time you finished counting in the last column.  Total how long it took to count up all 10 movements.  Finally, fill in the box that shows how long this took.  After counting 10 movements, you no longer have to count any more that day.  The next morning, just start counting again as soon as you get up.    What should you call a \"movement\"?  It is hard to say, because it will feel different from one mother to another and from one pregnancy to the next.  The important thing is that you count the movements the same way throughout your pregnancy.  If you have more questions, you should ask your physician.    Count carefully every day!  SAMPLE:  Week 28    How many hours did it take to feel 10 movements?       Start  Time     1     2     3     4     5     6     7     8     9     10   Finish Time   Mon 8:20           11:40                  Fri               Sat               Sun                 IMPORTANT: You should contact your physician if it takes more than two hours for you to feel 10 movements.  Each morning, write down the time and start to count the movements of your baby.  Keep track by checking off one box every time you feel one movement.  When you " "have felt 10 \"kicks\", write down the time you finished counting in the last column.  Then fill in the   box (over the check sal) for the number of hours it took.  Be sure to read the complete instructions on the previous page.          "

## 2024-11-21 NOTE — PROGRESS NOTES
OB Followup;    29w0d    Patient Active Problem List    Diagnosis Date Noted    Gestational hypertension, third trimester 2024    History of anxiety and depression 2024    History of asthma 2024    History of PCOS 2024    Primigravida of advanced maternal age in second trimester 2024       Vitals:    24 1018   BP: 126/83   Weight: 165 lb       Patient presents for followup of OB care. Currently doing well . Good fetal movement no leakage of fluid no contractions or vaginal bleeding        Size equals dates, normal fetal heart rate  #1 gestational hypertension currently on labetalol 100 mg p.o. twice daily with normal blood pressures today normal PIH labs    2.  Abnormal 3-hour GTT-2 abnormal levels very high-referral placed for diabetic counseling and testing  Patient requests continuous glucose monitor so prescriptions were written for this.  Patient states he is willing to pay out-of-pocket if insurance does not cover CGM    -      Labor precautions given  Discussed proper weight gain during pregnancy.    Signs and symptoms of labor/ labor discussed   Discussed proper exercise during pregnancy  Discussed proper oral fluid hydration  Reviewed fetal kick counts and appropriate fetal movement during pregnancy  Reviewed postpartum birth control methods  All questions answered in detail    Followup in 2 weeks

## 2024-11-26 ENCOUNTER — PATIENT MESSAGE (OUTPATIENT)
Dept: OBGYN | Facility: CLINIC | Age: 39
End: 2024-11-26
Payer: COMMERCIAL

## 2024-11-26 RX ORDER — LANCETS 30 GAUGE
1 EACH MISCELLANEOUS 4 TIMES DAILY
Qty: 100 EACH | Refills: 8 | Status: SHIPPED | OUTPATIENT
Start: 2024-11-26

## 2024-11-27 ENCOUNTER — NON-PROVIDER VISIT (OUTPATIENT)
Dept: OBGYN | Facility: CLINIC | Age: 39
End: 2024-11-27
Payer: COMMERCIAL

## 2024-11-27 NOTE — PROGRESS NOTES
11/27/2024 0900  Josephine attended English Gestational Diabetes class. Pt has GDM. Family Hx none. Pt brought in a [employer] CGM and  an over the counter glucose meter. Pt was taught verbally and hands on to use meter and finger stick done for a 97 blood sugar, 1 hours pp breakfast, yogurt.   Activity: walking.  Discussed what she needs to do after delivery for herself and family to limit risk for type two diabetes. All education and handouts given in English.  Discussed with pt sources of simple sugars, sources of complex carbs, Carb portions, Proteins and fats, plate distribution.  The pt received a 2000 calorie meal plan base on EER formula (with verification of Jaki's LORAINE RN) consisting of 3 meals and 3 snacks (see media for copy of meal plan).   Recommended meal times do to work:   B: 0830  S: 1030  L: 1300  S: 1530  D: 1800  S: 2230  Discussed with pt the need for a protein snack between dinner and bedtime snack, especially on nights when she and her  have Buddhism and eat dinner earlier than 1800. Pt agreed and verbalized understanding.  Pt was educated on carbohydrate containing foods vs non carbohydrate containing foods, the importance of small frequent meals, limiting carbohydrate to 1 serving in the morning, no fruit before noon/12pm, and avoiding all concentrated sweets for the remainder of her pregnancy. Explained the importance of not going >4hrs btwn eating during the day and no longer than 10hours overnight. Patient agrees to follow the meal plan and guidelines provided.   Meal plan scanned into media.

## 2024-12-06 ENCOUNTER — APPOINTMENT (OUTPATIENT)
Dept: OBGYN | Facility: CLINIC | Age: 39
End: 2024-12-06
Payer: COMMERCIAL

## 2024-12-06 VITALS — BODY MASS INDEX: 31.09 KG/M2 | SYSTOLIC BLOOD PRESSURE: 116 MMHG | WEIGHT: 170 LBS | DIASTOLIC BLOOD PRESSURE: 76 MMHG

## 2024-12-06 DIAGNOSIS — O24.410 GDM, CLASS A1: ICD-10-CM

## 2024-12-06 DIAGNOSIS — O13.3 GESTATIONAL HYPERTENSION, THIRD TRIMESTER: ICD-10-CM

## 2024-12-06 PROCEDURE — 3078F DIAST BP <80 MM HG: CPT | Performed by: OBSTETRICS & GYNECOLOGY

## 2024-12-06 PROCEDURE — 0502F SUBSEQUENT PRENATAL CARE: CPT | Performed by: OBSTETRICS & GYNECOLOGY

## 2024-12-06 PROCEDURE — 3074F SYST BP LT 130 MM HG: CPT | Performed by: OBSTETRICS & GYNECOLOGY

## 2024-12-06 ASSESSMENT — FIBROSIS 4 INDEX: FIB4 SCORE: 0.63

## 2024-12-06 NOTE — NON-PROVIDER
Pt here for OB f/u @ 31w1d  Tdap already given   RSV next visit @ 32 weeks   Pt has sugar logs present   Sent through my chart   Dr. Doyle 12/16/24

## 2024-12-06 NOTE — PROGRESS NOTES
OB Followup;    31w1d    Patient Active Problem List    Diagnosis Date Noted    GDM, class A1 11/21/2024    Gestational hypertension, third trimester 11/05/2024    History of anxiety and depression 08/02/2024    History of asthma 08/02/2024    History of PCOS 08/02/2024    Primigravida of advanced maternal age in second trimester 08/02/2024       Vitals:    12/06/24 1307   BP: 116/76   Weight: 170 lb       Patient presents for followup of OB care. Currently doing well . Good fetal movement no leakage of fluid no contractions or vaginal bleeding        Size equals dates, normal fetal heart rate    #1 gestational hypertension-labetalol 100 mg p.o. twice daily ASA 81 mg p.o. daily blood pressures look good.  PIH labs middle of her normal-normal LFTs, PC ratio 167-normal.  We did discuss the likely need for induction of labor prior to her EDC and discussed briefly the process    2.  Advanced maternal age-81 mg ASA daily-follow-up growth scan with Dr. Doyle on 16 December    3.  Class A1 GDM-has had diabetic class uses CGM-states her fasting sugars in the 70s and postprandials under 140.  We discussed using a sugar log to chart her sugars so they may be easier for me to evaluate.  Patient will need NSTs 1-2 times weekly starting at 32 weeks    4.  History of hyper mobile/flexible joints-patient has not had formal workup in the past and she does not have any family history of Andrade-Danlos syndrome.  We have tried to get the patient into see rheumatology to further evaluate but they have declined to see the patient until after pregnancy did have genetic screening using [employer] genetic screening panel which was negative for all diseases including Andrade-Danlos syndrome    Also discussed were; prenatal classes fact that the patient must go to labor and delivery triage center for any problems that she has at [facility]  Also discussed picking a pediatrician ahead of time and notifying the  office.            Labor precautions given  Discussed proper weight gain during pregnancy.    Signs and symptoms of labor/ labor discussed   Discussed proper exercise during pregnancy  Discussed proper oral fluid hydration  Reviewed fetal kick counts and appropriate fetal movement during pregnancy  Reviewed postpartum birth control methods  All questions answered in detail    Followup in  2 weeks

## 2024-12-09 ENCOUNTER — APPOINTMENT (OUTPATIENT)
Dept: OBGYN | Facility: CLINIC | Age: 39
End: 2024-12-09
Payer: COMMERCIAL

## 2024-12-13 RX ORDER — LABETALOL 100 MG/1
100 TABLET, FILM COATED ORAL 2 TIMES DAILY
Qty: 180 TABLET | Refills: 1 | Status: SHIPPED | OUTPATIENT
Start: 2024-12-13

## 2024-12-16 ENCOUNTER — ANCILLARY PROCEDURE (OUTPATIENT)
Dept: MATERNAL FETAL MEDICINE | Facility: MEDICAL CENTER | Age: 39
End: 2024-12-16
Attending: OBSTETRICS & GYNECOLOGY
Payer: COMMERCIAL

## 2024-12-16 VITALS
BODY MASS INDEX: 30.73 KG/M2 | SYSTOLIC BLOOD PRESSURE: 154 MMHG | DIASTOLIC BLOOD PRESSURE: 87 MMHG | HEART RATE: 86 BPM | WEIGHT: 168 LBS

## 2024-12-16 DIAGNOSIS — Z87.42 HISTORY OF PCOS: ICD-10-CM

## 2024-12-16 DIAGNOSIS — O09.522 MULTIGRAVIDA OF ADVANCED MATERNAL AGE IN SECOND TRIMESTER: ICD-10-CM

## 2024-12-16 DIAGNOSIS — O09.512 PRIMIGRAVIDA OF ADVANCED MATERNAL AGE IN SECOND TRIMESTER: ICD-10-CM

## 2024-12-16 DIAGNOSIS — O13.3 GESTATIONAL HYPERTENSION, THIRD TRIMESTER: ICD-10-CM

## 2024-12-16 DIAGNOSIS — Z86.59 HISTORY OF ANXIETY: ICD-10-CM

## 2024-12-16 DIAGNOSIS — O24.410 GDM, CLASS A1: ICD-10-CM

## 2024-12-16 DIAGNOSIS — Z87.09 HISTORY OF ASTHMA: ICD-10-CM

## 2024-12-16 PROCEDURE — 99999 PR NO CHARGE: CPT | Performed by: OBSTETRICS & GYNECOLOGY

## 2024-12-16 PROCEDURE — 76816 OB US FOLLOW-UP PER FETUS: CPT | Performed by: OBSTETRICS & GYNECOLOGY

## 2024-12-16 ASSESSMENT — FIBROSIS 4 INDEX: FIB4 SCORE: 0.63

## 2024-12-23 ENCOUNTER — NON-PROVIDER VISIT (OUTPATIENT)
Dept: OBGYN | Facility: CLINIC | Age: 39
End: 2024-12-23
Payer: COMMERCIAL

## 2024-12-23 ENCOUNTER — ROUTINE PRENATAL (OUTPATIENT)
Dept: OBGYN | Facility: CLINIC | Age: 39
End: 2024-12-23
Payer: COMMERCIAL

## 2024-12-23 ENCOUNTER — HOSPITAL ENCOUNTER (OUTPATIENT)
Dept: LAB | Facility: MEDICAL CENTER | Age: 39
End: 2024-12-23
Attending: STUDENT IN AN ORGANIZED HEALTH CARE EDUCATION/TRAINING PROGRAM
Payer: COMMERCIAL

## 2024-12-23 VITALS — SYSTOLIC BLOOD PRESSURE: 138 MMHG | DIASTOLIC BLOOD PRESSURE: 89 MMHG

## 2024-12-23 DIAGNOSIS — O09.512 PRIMIGRAVIDA OF ADVANCED MATERNAL AGE IN SECOND TRIMESTER: Primary | ICD-10-CM

## 2024-12-23 DIAGNOSIS — O13.3 GESTATIONAL HYPERTENSION, THIRD TRIMESTER: ICD-10-CM

## 2024-12-23 DIAGNOSIS — O24.410 GDM, CLASS A1: ICD-10-CM

## 2024-12-23 LAB
BASOPHILS # BLD AUTO: 0.5 % (ref 0–1.8)
BASOPHILS # BLD: 0.06 K/UL (ref 0–0.12)
CREAT UR-MCNC: 25.47 MG/DL
EOSINOPHIL # BLD AUTO: 0.13 K/UL (ref 0–0.51)
EOSINOPHIL NFR BLD: 1 % (ref 0–6.9)
ERYTHROCYTE [DISTWIDTH] IN BLOOD BY AUTOMATED COUNT: 42.6 FL (ref 35.9–50)
HCT VFR BLD AUTO: 37.9 % (ref 37–47)
HGB BLD-MCNC: 12.3 G/DL (ref 12–16)
IMM GRANULOCYTES # BLD AUTO: 0.08 K/UL (ref 0–0.11)
IMM GRANULOCYTES NFR BLD AUTO: 0.6 % (ref 0–0.9)
LYMPHOCYTES # BLD AUTO: 2.93 K/UL (ref 1–4.8)
LYMPHOCYTES NFR BLD: 23.4 % (ref 22–41)
MCH RBC QN AUTO: 29.4 PG (ref 27–33)
MCHC RBC AUTO-ENTMCNC: 32.5 G/DL (ref 32.2–35.5)
MCV RBC AUTO: 90.7 FL (ref 81.4–97.8)
MONOCYTES # BLD AUTO: 0.85 K/UL (ref 0–0.85)
MONOCYTES NFR BLD AUTO: 6.8 % (ref 0–13.4)
NEUTROPHILS # BLD AUTO: 8.46 K/UL (ref 1.82–7.42)
NEUTROPHILS NFR BLD: 67.7 % (ref 44–72)
NRBC # BLD AUTO: 0 K/UL
NRBC BLD-RTO: 0 /100 WBC (ref 0–0.2)
PLATELET # BLD AUTO: 254 K/UL (ref 164–446)
PMV BLD AUTO: 11 FL (ref 9–12.9)
PROT UR-MCNC: <4 MG/DL (ref 0–15)
PROT/CREAT UR: NORMAL MG/G (ref 10–107)
RBC # BLD AUTO: 4.18 M/UL (ref 4.2–5.4)
WBC # BLD AUTO: 12.5 K/UL (ref 4.8–10.8)

## 2024-12-23 PROCEDURE — 3075F SYST BP GE 130 - 139MM HG: CPT | Performed by: STUDENT IN AN ORGANIZED HEALTH CARE EDUCATION/TRAINING PROGRAM

## 2024-12-23 PROCEDURE — 80053 COMPREHEN METABOLIC PANEL: CPT

## 2024-12-23 PROCEDURE — 82570 ASSAY OF URINE CREATININE: CPT

## 2024-12-23 PROCEDURE — 59025 FETAL NON-STRESS TEST: CPT | Performed by: STUDENT IN AN ORGANIZED HEALTH CARE EDUCATION/TRAINING PROGRAM

## 2024-12-23 PROCEDURE — 3079F DIAST BP 80-89 MM HG: CPT | Performed by: STUDENT IN AN ORGANIZED HEALTH CARE EDUCATION/TRAINING PROGRAM

## 2024-12-23 PROCEDURE — 36415 COLL VENOUS BLD VENIPUNCTURE: CPT

## 2024-12-23 PROCEDURE — 85025 COMPLETE CBC W/AUTO DIFF WBC: CPT

## 2024-12-23 PROCEDURE — 0502F SUBSEQUENT PRENATAL CARE: CPT | Performed by: STUDENT IN AN ORGANIZED HEALTH CARE EDUCATION/TRAINING PROGRAM

## 2024-12-23 PROCEDURE — 84156 ASSAY OF PROTEIN URINE: CPT

## 2024-12-23 NOTE — PROGRESS NOTES
[facility]  RETURN OB VISIT    S: Pt is a 39 y.o.  at 33w4d gestation here today for routine prenatal care.     Pregnancy complicated by:  Patient Active Problem List   Diagnosis    History of anxiety and depression    History of asthma    History of PCOS    Primigravida of advanced maternal age in second trimester    Gestational hypertension, third trimester    GDM, class A1     Denies vaginal bleeding and loss of fluid. Feeling some cramping, no contractions. Fetal movement present.     O: /89   LMP 2024 (Exact Date)    *see prenatal flowsheet*     NST Interpretation  Indication: Reactive  Baseline: 130  Variability: moderate  Accelerations: present  Decelerations: absent  Interpretation: Reactive  Monitored for 30 minutes    Physical Exam:  Gen: no acute distress  Pulm: easy work of breathing on room air  Abd: gravid, nontender    Labs:  Prenatal labs: Completed and normal  GS/GTT: failed   GBS: Not yet collected  Genetic testing: low risk NIPT  STI testing: negative    Ultrasound:  : transverse, posterior placenta, 3vc, GADIEL 14.2, EFW 356g (39%), normal anatomy aside from small muscular VSD  US  complete, read not yet done  Next US  with Dr. Doyle    Vaccinations:  Flu: received   Tdap:   RSV: Desires today (), however out of stock in clinic. Pt plans to go to local pharmacy.    A/P: 39 y.o.  with IUP at 33w4d presents for return OB visit    #PNC  - ED precautions reviewed  - Plan for twice weekly NSTs given gHTN and GDM. Growth US q4wks  - Reactive NST    #Gestational Hypertension  - Labetalol 100mg TID (inc by Dr. Doyle on )  - Aspirin 81mg daily  - Normotensive today after increase in BP med. Repeat PIH labs ordered    #A1DM  - 1h , 3h GTT 70/219/189/99 (2/4 elevated)  - Fasting values all wnl. Three elevated postprandial values over one week. Continue diet control at this time.    #Advanced maternal age  - aspirin 81mg     #History of  hypermobile/flexible joints  - No family hx of EDS and negative Horizon screening  - Referred to Rheum, but declined to see pt until after pregnancy    #Asthma, situational  - Triggered by smoking, chemicals    #Anxiety/Depression/ADHD  - No current therapist, stopped approx 1 year ago  - No current issues. Discussed increased risk of  mood disorders.     Dispo: Weekly MICHAEL visits, twice weekly NSTs    Abeba Acuña M.D.

## 2024-12-24 LAB
ALBUMIN SERPL BCP-MCNC: 3.5 G/DL (ref 3.2–4.9)
ALBUMIN/GLOB SERPL: 1.1 G/DL
ALP SERPL-CCNC: 92 U/L (ref 30–99)
ALT SERPL-CCNC: 14 U/L (ref 2–50)
ANION GAP SERPL CALC-SCNC: 12 MMOL/L (ref 7–16)
AST SERPL-CCNC: 25 U/L (ref 12–45)
BILIRUB SERPL-MCNC: 0.3 MG/DL (ref 0.1–1.5)
BUN SERPL-MCNC: 12 MG/DL (ref 8–22)
CALCIUM ALBUM COR SERPL-MCNC: 11.6 MG/DL (ref 8.5–10.5)
CALCIUM SERPL-MCNC: 11.2 MG/DL (ref 8.5–10.5)
CHLORIDE SERPL-SCNC: 101 MMOL/L (ref 96–112)
CO2 SERPL-SCNC: 20 MMOL/L (ref 20–33)
CREAT SERPL-MCNC: 0.72 MG/DL (ref 0.5–1.4)
GFR SERPLBLD CREATININE-BSD FMLA CKD-EPI: 109 ML/MIN/1.73 M 2
GLOBULIN SER CALC-MCNC: 3.1 G/DL (ref 1.9–3.5)
GLUCOSE SERPL-MCNC: 72 MG/DL (ref 65–99)
POTASSIUM SERPL-SCNC: 4 MMOL/L (ref 3.6–5.5)
PROT SERPL-MCNC: 6.6 G/DL (ref 6–8.2)
SODIUM SERPL-SCNC: 133 MMOL/L (ref 135–145)

## 2024-12-27 ENCOUNTER — NON-PROVIDER VISIT (OUTPATIENT)
Dept: OBGYN | Facility: CLINIC | Age: 39
End: 2024-12-27
Payer: COMMERCIAL

## 2024-12-27 ENCOUNTER — TELEPHONE (OUTPATIENT)
Dept: MATERNAL FETAL MEDICINE | Facility: MEDICAL CENTER | Age: 39
End: 2024-12-27

## 2024-12-27 VITALS — SYSTOLIC BLOOD PRESSURE: 131 MMHG | WEIGHT: 168.9 LBS | DIASTOLIC BLOOD PRESSURE: 82 MMHG | BODY MASS INDEX: 30.89 KG/M2

## 2024-12-27 ASSESSMENT — FIBROSIS 4 INDEX: FIB4 SCORE: 1.03

## 2024-12-27 NOTE — PROGRESS NOTES
Josephine Marie   Gestational age: 34w1d     Indications: pregnancy-induced hypertension, gestational diabetes mellitus, and AMA  Baseline 130, reactive, Variability  6-25 BPM, Accelerations: Yes, Decelerations: No        English

## 2024-12-31 ENCOUNTER — NON-PROVIDER VISIT (OUTPATIENT)
Dept: OBGYN | Facility: CLINIC | Age: 39
End: 2024-12-31
Payer: COMMERCIAL

## 2024-12-31 VITALS — BODY MASS INDEX: 31.28 KG/M2 | WEIGHT: 171 LBS

## 2024-12-31 ASSESSMENT — FIBROSIS 4 INDEX: FIB4 SCORE: 1.03

## 2024-12-31 NOTE — PROGRESS NOTES
Josephine Marie, a  at 34w5d with an SALLY of 2025, by Last Menstrual Period, was seen at [facility] for a nonstress test for AMA, GDM A2, and HTN. PT is just on for NST.

## 2025-01-01 NOTE — PROGRESS NOTES
Josephine Marie   Gestational age: 34w5d     Indication: pregnancy-induced hypertension, gestational diabetes mellitus (A1), advanced maternal age    NST Interpretation: Category 1  Baseline 130bpm, reactive, Variability  6-25 BPM, Accelerations: Yes, Decelerations: No

## 2025-01-02 ENCOUNTER — APPOINTMENT (OUTPATIENT)
Dept: OBGYN | Facility: CLINIC | Age: 40
End: 2025-01-02
Payer: COMMERCIAL

## 2025-01-02 VITALS — WEIGHT: 172 LBS | SYSTOLIC BLOOD PRESSURE: 144 MMHG | BODY MASS INDEX: 31.46 KG/M2 | DIASTOLIC BLOOD PRESSURE: 84 MMHG

## 2025-01-02 DIAGNOSIS — O24.410 GDM, CLASS A1: ICD-10-CM

## 2025-01-02 DIAGNOSIS — O13.3 GESTATIONAL HYPERTENSION, THIRD TRIMESTER: ICD-10-CM

## 2025-01-02 PROCEDURE — 0502F SUBSEQUENT PRENATAL CARE: CPT | Performed by: OBSTETRICS & GYNECOLOGY

## 2025-01-02 PROCEDURE — 3079F DIAST BP 80-89 MM HG: CPT | Performed by: OBSTETRICS & GYNECOLOGY

## 2025-01-02 PROCEDURE — 3077F SYST BP >= 140 MM HG: CPT | Performed by: OBSTETRICS & GYNECOLOGY

## 2025-01-02 PROCEDURE — 59025 FETAL NON-STRESS TEST: CPT | Performed by: OBSTETRICS & GYNECOLOGY

## 2025-01-02 ASSESSMENT — FIBROSIS 4 INDEX: FIB4 SCORE: 1.03

## 2025-01-02 NOTE — PROGRESS NOTES
OB Followup;    35w0d    Patient Active Problem List    Diagnosis Date Noted    GDM, class A1 2024    Gestational hypertension, third trimester 2024    History of anxiety and depression 2024    History of asthma 2024    History of PCOS 2024    Primigravida of advanced maternal age in second trimester 2024       Vitals:    25 1036   BP: (!) 144/84   Weight: 172 lb     Repeat blood pressures 130s over 80s-while on NST  Patient presents for followup of OB care. Currently doing well . Good fetal movement no leakage of fluid no contractions or vaginal bleeding        Size equals dates, normal fetal heart rate      #1 gestational hypertension-labetalol 100 mg 3 times daily, ASA 81 mg daily blood pressure today 144/84-NST today    2 advanced maternal age-81 mg ASA-follow-up growth scan sixth January with Dr. Doyle    3.  Class A1 GDM-fasting sugars in the 70s to low 80s 1 hour postprandial sugars under 140    4.  History of hypermobile/flexible joints      NST indication; AMA, chronic hypertension  Accelerations-present  Decelerations-negative  Contractions-negative  Baseline fetal heart rate 140 bpm  NST interpretation reactive nonstress test          Labor precautions given  Discussed proper weight gain during pregnancy.    Signs and symptoms of labor/ labor discussed   Discussed proper exercise during pregnancy  Discussed proper oral fluid hydration  Reviewed fetal kick counts and appropriate fetal movement during pregnancy  Reviewed postpartum birth control methods  All questions answered in detail    Followup in  1 weeks

## 2025-01-02 NOTE — NON-PROVIDER
Pt here for OB f/u   GBS next visit   All labs complete   US growth on 1/6/25  NST today due to AMA  Sugar logs monitored on Phone

## 2025-01-06 ENCOUNTER — APPOINTMENT (OUTPATIENT)
Dept: MATERNAL FETAL MEDICINE | Facility: MEDICAL CENTER | Age: 40
End: 2025-01-06
Payer: COMMERCIAL

## 2025-01-06 ENCOUNTER — HOSPITAL ENCOUNTER (EMERGENCY)
Facility: MEDICAL CENTER | Age: 40
End: 2025-01-06
Attending: OBSTETRICS & GYNECOLOGY | Admitting: OBSTETRICS & GYNECOLOGY
Payer: COMMERCIAL

## 2025-01-06 ENCOUNTER — ROUTINE PRENATAL (OUTPATIENT)
Dept: OBGYN | Facility: CLINIC | Age: 40
End: 2025-01-06
Payer: COMMERCIAL

## 2025-01-06 ENCOUNTER — NON-PROVIDER VISIT (OUTPATIENT)
Dept: OBGYN | Facility: CLINIC | Age: 40
End: 2025-01-06
Payer: COMMERCIAL

## 2025-01-06 VITALS — DIASTOLIC BLOOD PRESSURE: 84 MMHG | SYSTOLIC BLOOD PRESSURE: 135 MMHG | BODY MASS INDEX: 32.01 KG/M2 | WEIGHT: 175 LBS

## 2025-01-06 VITALS
WEIGHT: 175.4 LBS | DIASTOLIC BLOOD PRESSURE: 101 MMHG | SYSTOLIC BLOOD PRESSURE: 155 MMHG | HEART RATE: 100 BPM | BODY MASS INDEX: 32.08 KG/M2

## 2025-01-06 VITALS
HEART RATE: 74 BPM | BODY MASS INDEX: 31.47 KG/M2 | HEIGHT: 62 IN | OXYGEN SATURATION: 96 % | SYSTOLIC BLOOD PRESSURE: 128 MMHG | WEIGHT: 171 LBS | DIASTOLIC BLOOD PRESSURE: 81 MMHG | RESPIRATION RATE: 16 BRPM | TEMPERATURE: 97.6 F

## 2025-01-06 DIAGNOSIS — O09.513 PRIMIGRAVIDA OF ADVANCED MATERNAL AGE IN THIRD TRIMESTER: ICD-10-CM

## 2025-01-06 DIAGNOSIS — Z86.59 HISTORY OF ANXIETY: ICD-10-CM

## 2025-01-06 DIAGNOSIS — Z87.09 HISTORY OF ASTHMA: ICD-10-CM

## 2025-01-06 DIAGNOSIS — O09.512 PRIMIGRAVIDA OF ADVANCED MATERNAL AGE IN SECOND TRIMESTER: ICD-10-CM

## 2025-01-06 DIAGNOSIS — O24.410 DIET CONTROLLED GESTATIONAL DIABETES MELLITUS (GDM) IN THIRD TRIMESTER: ICD-10-CM

## 2025-01-06 DIAGNOSIS — O09.522 MULTIGRAVIDA OF ADVANCED MATERNAL AGE IN SECOND TRIMESTER: ICD-10-CM

## 2025-01-06 DIAGNOSIS — O10.919 CHRONIC HYPERTENSION AFFECTING PREGNANCY: ICD-10-CM

## 2025-01-06 DIAGNOSIS — O16.3 HYPERTENSION AFFECTING PREGNANCY IN THIRD TRIMESTER: ICD-10-CM

## 2025-01-06 DIAGNOSIS — Z87.42 HISTORY OF PCOS: ICD-10-CM

## 2025-01-06 DIAGNOSIS — O13.3 GESTATIONAL HYPERTENSION, THIRD TRIMESTER: ICD-10-CM

## 2025-01-06 DIAGNOSIS — Z3A.35 35 WEEKS GESTATION OF PREGNANCY: ICD-10-CM

## 2025-01-06 DIAGNOSIS — O24.410 GDM, CLASS A1: ICD-10-CM

## 2025-01-06 LAB
ALBUMIN SERPL BCP-MCNC: 3.5 G/DL (ref 3.2–4.9)
ALBUMIN/GLOB SERPL: 1.2 G/DL
ALP SERPL-CCNC: 96 U/L (ref 30–99)
ALT SERPL-CCNC: 17 U/L (ref 2–50)
ANION GAP SERPL CALC-SCNC: 10 MMOL/L (ref 7–16)
APPEARANCE UR: CLEAR
AST SERPL-CCNC: 25 U/L (ref 12–45)
BASOPHILS # BLD AUTO: 0.6 % (ref 0–1.8)
BASOPHILS # BLD: 0.07 K/UL (ref 0–0.12)
BILIRUB SERPL-MCNC: 0.2 MG/DL (ref 0.1–1.5)
BILIRUB UR STRIP-MCNC: NEGATIVE MG/DL
BUN SERPL-MCNC: 9 MG/DL (ref 8–22)
CALCIUM ALBUM COR SERPL-MCNC: 10.2 MG/DL (ref 8.5–10.5)
CALCIUM SERPL-MCNC: 9.8 MG/DL (ref 8.5–10.5)
CHLORIDE SERPL-SCNC: 105 MMOL/L (ref 96–112)
CO2 SERPL-SCNC: 20 MMOL/L (ref 20–33)
COLOR UR AUTO: YELLOW
CREAT SERPL-MCNC: 0.85 MG/DL (ref 0.5–1.4)
CREAT UR-MCNC: 58.6 MG/DL
EOSINOPHIL # BLD AUTO: 0.09 K/UL (ref 0–0.51)
EOSINOPHIL NFR BLD: 0.8 % (ref 0–6.9)
ERYTHROCYTE [DISTWIDTH] IN BLOOD BY AUTOMATED COUNT: 43.5 FL (ref 35.9–50)
GFR SERPLBLD CREATININE-BSD FMLA CKD-EPI: 89 ML/MIN/1.73 M 2
GLOBULIN SER CALC-MCNC: 3 G/DL (ref 1.9–3.5)
GLUCOSE SERPL-MCNC: 99 MG/DL (ref 65–99)
GLUCOSE UR STRIP.AUTO-MCNC: NEGATIVE MG/DL
HCT VFR BLD AUTO: 38.1 % (ref 37–47)
HGB BLD-MCNC: 12.5 G/DL (ref 12–16)
IMM GRANULOCYTES # BLD AUTO: 0.13 K/UL (ref 0–0.11)
IMM GRANULOCYTES NFR BLD AUTO: 1.1 % (ref 0–0.9)
KETONES UR STRIP.AUTO-MCNC: NEGATIVE MG/DL
LEUKOCYTE ESTERASE UR QL STRIP.AUTO: NORMAL
LYMPHOCYTES # BLD AUTO: 3.07 K/UL (ref 1–4.8)
LYMPHOCYTES NFR BLD: 25.9 % (ref 22–41)
MCH RBC QN AUTO: 29.8 PG (ref 27–33)
MCHC RBC AUTO-ENTMCNC: 32.8 G/DL (ref 32.2–35.5)
MCV RBC AUTO: 90.9 FL (ref 81.4–97.8)
MONOCYTES # BLD AUTO: 0.66 K/UL (ref 0–0.85)
MONOCYTES NFR BLD AUTO: 5.6 % (ref 0–13.4)
NEUTROPHILS # BLD AUTO: 7.85 K/UL (ref 1.82–7.42)
NEUTROPHILS NFR BLD: 66 % (ref 44–72)
NITRITE UR QL STRIP.AUTO: NEGATIVE
NRBC # BLD AUTO: 0 K/UL
NRBC BLD-RTO: 0 /100 WBC (ref 0–0.2)
PH UR STRIP.AUTO: 7 [PH] (ref 5–8)
PLATELET # BLD AUTO: 260 K/UL (ref 164–446)
PMV BLD AUTO: 11.1 FL (ref 9–12.9)
POTASSIUM SERPL-SCNC: 4 MMOL/L (ref 3.6–5.5)
PROT SERPL-MCNC: 6.5 G/DL (ref 6–8.2)
PROT UR QL STRIP: NEGATIVE MG/DL
PROT UR-MCNC: 6.4 MG/DL (ref 0–15)
PROT/CREAT UR: 109 MG/G (ref 10–107)
RBC # BLD AUTO: 4.19 M/UL (ref 4.2–5.4)
RBC UR QL AUTO: NEGATIVE
SODIUM SERPL-SCNC: 135 MMOL/L (ref 135–145)
SP GR UR STRIP.AUTO: 1.02
UROBILINOGEN UR STRIP-MCNC: NORMAL MG/DL
WBC # BLD AUTO: 11.9 K/UL (ref 4.8–10.8)

## 2025-01-06 PROCEDURE — 85025 COMPLETE CBC W/AUTO DIFF WBC: CPT

## 2025-01-06 PROCEDURE — 99285 EMERGENCY DEPT VISIT HI MDM: CPT

## 2025-01-06 PROCEDURE — A9270 NON-COVERED ITEM OR SERVICE: HCPCS | Performed by: STUDENT IN AN ORGANIZED HEALTH CARE EDUCATION/TRAINING PROGRAM

## 2025-01-06 PROCEDURE — 99284 EMERGENCY DEPT VISIT MOD MDM: CPT | Performed by: STUDENT IN AN ORGANIZED HEALTH CARE EDUCATION/TRAINING PROGRAM

## 2025-01-06 PROCEDURE — 700102 HCHG RX REV CODE 250 W/ 637 OVERRIDE(OP): Performed by: STUDENT IN AN ORGANIZED HEALTH CARE EDUCATION/TRAINING PROGRAM

## 2025-01-06 PROCEDURE — 82570 ASSAY OF URINE CREATININE: CPT

## 2025-01-06 PROCEDURE — 36415 COLL VENOUS BLD VENIPUNCTURE: CPT

## 2025-01-06 PROCEDURE — 59025 FETAL NON-STRESS TEST: CPT | Performed by: OBSTETRICS & GYNECOLOGY

## 2025-01-06 PROCEDURE — 84156 ASSAY OF PROTEIN URINE: CPT

## 2025-01-06 PROCEDURE — 76816 OB US FOLLOW-UP PER FETUS: CPT | Performed by: OBSTETRICS & GYNECOLOGY

## 2025-01-06 PROCEDURE — 80053 COMPREHEN METABOLIC PANEL: CPT

## 2025-01-06 PROCEDURE — 0502F SUBSEQUENT PRENATAL CARE: CPT | Performed by: OBSTETRICS & GYNECOLOGY

## 2025-01-06 PROCEDURE — 81002 URINALYSIS NONAUTO W/O SCOPE: CPT | Performed by: OBSTETRICS & GYNECOLOGY

## 2025-01-06 PROCEDURE — 59025 FETAL NON-STRESS TEST: CPT

## 2025-01-06 RX ORDER — LABETALOL 100 MG/1
100 TABLET, FILM COATED ORAL ONCE
Status: COMPLETED | OUTPATIENT
Start: 2025-01-06 | End: 2025-01-06

## 2025-01-06 RX ORDER — LABETALOL 200 MG/1
200 TABLET, FILM COATED ORAL EVERY 8 HOURS
Qty: 90 TABLET | Refills: 0 | Status: ON HOLD | OUTPATIENT
Start: 2025-01-06 | End: 2025-01-21

## 2025-01-06 RX ADMIN — LABETALOL HYDROCHLORIDE 100 MG: 100 TABLET, FILM COATED ORAL at 15:42

## 2025-01-06 ASSESSMENT — FIBROSIS 4 INDEX
FIB4 SCORE: 1.03

## 2025-01-06 ASSESSMENT — PAIN SCALES - GENERAL: PAINLEVEL: 0 - NO PAIN

## 2025-01-06 NOTE — PROGRESS NOTES
1410: Pt is a  at 35.4 weeks today, pt presents to L&D after being sent over from the office following elevated BP's today. Pt reports +FM, denies VB/LOF/Uc's. Pt reports mild new edema in hands and feet for past 2 days. EFM and TOCO applied, VS taken, pt comfortable in bed at this time. Report given to Dr Alejandra and Dr Austin, orders received, see orders for details.     1428: This RN updated Dr Alejandra and Dr Casarez on pt status/VS/FHT. POC discussed.    1440: Report given to Vale CHAPMAN, POC discussed.

## 2025-01-06 NOTE — PROGRESS NOTES
Josephineingrid Marie, a  at 35w4d with an SALLY of 2025, by Last Menstrual Period, was seen at [facility] for a nonstress test for AMA, GDM A1, and HTN. Pt has OBFV with Dr. Clark after.

## 2025-01-06 NOTE — PROGRESS NOTES
Pt is here for a OB Follow-up.  Reports good FM.  Pt denies VB, LOF or UC's.  Sagar states she is doing well, she report slight swelling in her hands and feet.  Patient's phone number is [phone]   Pharm was verified.  Pt reports not having spotting, unusual odor, any discharge, discomfort or pain.   Pt doesn't have any questions, comment or concerns at this moment.   Pt reports eating Cheerios for breakfast.

## 2025-01-06 NOTE — ED PROVIDER NOTES
OB ED EVALUATION NOTE    SUBJECTIVE:  Josephine Marie is a 39 y.o.,  at 35w4d who presents from Dr. Doyle's office for further evaluation of BP readings higher than her baseline in setting of cHTN. She reports mild swelling of hands. Denies new H/A, vision changes, CP, dyspnea, or RUQ/epigastric pain.     cHTN  Diagnosed during this pregnancy; first elevated BP <20 weeks  Taking labetalol 100 mg TID; has been on this regimen since 24    She denies vaginal bleeding, leakage of fluid, or contractions. She states the baby is moving.     I personally reviewed the past medical and surgical histories, as well as the problem list.    Patient Active Problem List   Diagnosis    History of anxiety and depression    History of asthma    History of PCOS    Primigravida of advanced maternal age in second trimester    Chronic hypertension affecting pregnancy    GDM, class A1         OBJECTIVE:  Vital Signs:   Vitals:    25 1629   BP: 117/67   Pulse: 83   Resp:    Temp:    SpO2:      GEN: NAD, alert and conversant, NAD  CV: RRR, nl S1 and S2  Resp: unlabored, symmetric chest rise, CTAB  Abd: soft, NT, gravid  Ext: no edema BLE      NST read: 135/moderate variability/+accels/no decels  Moore: irregular ctx Q3-6 minutes, very few perceived at all by patient and rated as mild    LABS / IMAGING:  Results for orders placed or performed during the hospital encounter of 25   CBC WITH DIFFERENTIAL    Collection Time: 25  2:12 PM   Result Value Ref Range    WBC 11.9 (H) 4.8 - 10.8 K/uL    RBC 4.19 (L) 4.20 - 5.40 M/uL    Hemoglobin 12.5 12.0 - 16.0 g/dL    Hematocrit 38.1 37.0 - 47.0 %    MCV 90.9 81.4 - 97.8 fL    MCH 29.8 27.0 - 33.0 pg    MCHC 32.8 32.2 - 35.5 g/dL    RDW 43.5 35.9 - 50.0 fL    Platelet Count 260 164 - 446 K/uL    MPV 11.1 9.0 - 12.9 fL    Neutrophils-Polys 66.00 44.00 - 72.00 %    Lymphocytes 25.90 22.00 - 41.00 %    Monocytes 5.60 0.00 - 13.40 %    Eosinophils 0.80 0.00 - 6.90 %     Basophils 0.60 0.00 - 1.80 %    Immature Granulocytes 1.10 (H) 0.00 - 0.90 %    Nucleated RBC 0.00 0.00 - 0.20 /100 WBC    Neutrophils (Absolute) 7.85 (H) 1.82 - 7.42 K/uL    Lymphs (Absolute) 3.07 1.00 - 4.80 K/uL    Monos (Absolute) 0.66 0.00 - 0.85 K/uL    Eos (Absolute) 0.09 0.00 - 0.51 K/uL    Baso (Absolute) 0.07 0.00 - 0.12 K/uL    Immature Granulocytes (abs) 0.13 (H) 0.00 - 0.11 K/uL    NRBC (Absolute) 0.00 K/uL   Comp Metabolic Panel    Collection Time: 25  2:12 PM   Result Value Ref Range    Sodium 135 135 - 145 mmol/L    Potassium 4.0 3.6 - 5.5 mmol/L    Chloride 105 96 - 112 mmol/L    Co2 20 20 - 33 mmol/L    Anion Gap 10.0 7.0 - 16.0    Glucose 99 65 - 99 mg/dL    Bun 9 8 - 22 mg/dL    Creatinine 0.85 0.50 - 1.40 mg/dL    Calcium 9.8 8.5 - 10.5 mg/dL    Correct Calcium 10.2 8.5 - 10.5 mg/dL    AST(SGOT) 25 12 - 45 U/L    ALT(SGPT) 17 2 - 50 U/L    Alkaline Phosphatase 96 30 - 99 U/L    Total Bilirubin 0.2 0.1 - 1.5 mg/dL    Albumin 3.5 3.2 - 4.9 g/dL    Total Protein 6.5 6.0 - 8.2 g/dL    Globulin 3.0 1.9 - 3.5 g/dL    A-G Ratio 1.2 g/dL   ESTIMATED GFR    Collection Time: 25  2:12 PM   Result Value Ref Range    GFR (CKD-EPI) 89 >60 mL/min/1.73 m 2   PROTEIN/CREAT RATIO URINE    Collection Time: 25  3:20 PM   Result Value Ref Range    Total Protein, Urine 6.4 0.0 - 15.0 mg/dL    Creatinine, Random Urine 58.60 mg/dL    Protein Creatinine Ratio 109 (H) 10 - 107 mg/g         ASSESSMENT AND PLAN:  39 y.o.  at 35w4d presenting for further evaluation of elevated BP above baseline during office visit and [facility] appointment to rule out superimposed pre-eclampsia.     #cHTN  #Elevated BP  Asymptomatic other than mild edema of hands, not acute onset or severe  Single severe range BP when first put on monitor in triage; otherwise normal to mildly elevated.   CBC, CMP, and urine P:C reassuring. Continue to monitor; Cr 0.85 (baseline 0.5)  - Increase home labetalol to 200 mg TID; new Rx sent.  Discussed signs of hypotension with patient and reasons to call office.  - RTC as scheduled for NST later this week and OB f/u in 1 week  - F/u with M office about scheduling any necessary f/u before delivery    The patient was instructed to follow up in the office at next scheduled visit.  She was counseled to call or return for vaginal bleeding, regular contractions, leakage of fluid or decreased fetal movement.    Meenu Alejandra M.D.

## 2025-01-06 NOTE — PROGRESS NOTES
OB Followup;    35w4d    Patient Active Problem List    Diagnosis Date Noted    GDM, class A1 2024    Gestational hypertension, third trimester 2024    History of anxiety and depression 2024    History of asthma 2024    History of PCOS 2024    Primigravida of advanced maternal age in second trimester 2024       Vitals:    25 0926 25 1120   BP: (!) 145/83 135/84   Weight: 175 lb        Patient presents for followup of OB care. Currently doing well . Good fetal movement no leakage of fluid no contractions or vaginal bleeding        Size equals dates, normal fetal heart rate      #1 gestational hypertension labetalol 100 mg 3 times daily-initial blood pressure 145/80 3 repeat on NST 120s over 80s-NST today-will likely recommend IOL at 37 to 38 weeks    2.  Advanced maternal age continues on ASA 81 mg patient has follow-up with Dr. Doyle today 2025 for Dopplers growth scan    3.  Class A1 GDM with good control    4.  History of hypermobile/flexible joints    NST indications; AMA, chronic hypertension-treated  Accelerations-present  Decelerations-negative  Contractions-negative  Baseline baseline fetal heart rate 130 bpm  NST interpretation reactive nonstress test    Labor precautions given  Discussed proper weight gain during pregnancy.    Signs and symptoms of labor/ labor discussed   Discussed proper exercise during pregnancy  Discussed proper oral fluid hydration  Reviewed fetal kick counts and appropriate fetal movement during pregnancy  Reviewed postpartum birth control methods  All questions answered in detail    Followup in  1 weeks

## 2025-01-07 NOTE — PROGRESS NOTES
1440 Report received from Mariella REEVES RN and [facility] care. POC discussed with pt and s/o and encouraged to state needs or questions at any time.    1550 Dr. Alejandra at bedside, POC discussed with patient. Waiting for P/C ratio result.    1720 Dr. Alejandra at bedside, POC discussed with patient. D/C order received.    1733 L&D D/C instructions reviewed with patient and s/o who state understanding. Patient d/c to self with s/o.

## 2025-01-09 ENCOUNTER — NON-PROVIDER VISIT (OUTPATIENT)
Dept: OBGYN | Facility: CLINIC | Age: 40
End: 2025-01-09
Payer: COMMERCIAL

## 2025-01-09 VITALS — BODY MASS INDEX: 31.64 KG/M2 | SYSTOLIC BLOOD PRESSURE: 134 MMHG | DIASTOLIC BLOOD PRESSURE: 79 MMHG | WEIGHT: 173 LBS

## 2025-01-09 PROCEDURE — 59025 FETAL NON-STRESS TEST: CPT

## 2025-01-09 ASSESSMENT — FIBROSIS 4 INDEX: FIB4 SCORE: .9095085938862486507

## 2025-01-09 NOTE — PROGRESS NOTES
Josephine Marie, a  at 36w0d with an SALLY of 2025, by Last Menstrual Period, was seen at [facility] for a nonstress test for AMA, GDM A1, and HTN.

## 2025-01-09 NOTE — PROGRESS NOTES
Josephine Marie   Gestational age: 36w0d     Indications: gestational diabetes mellitus, AMA, HTN  Baseline 130, reactive, Variability  6-25 BPM, Accelerations: Yes, Decelerations: No

## 2025-01-13 ENCOUNTER — HOSPITAL ENCOUNTER (OUTPATIENT)
Facility: MEDICAL CENTER | Age: 40
End: 2025-01-13
Attending: STUDENT IN AN ORGANIZED HEALTH CARE EDUCATION/TRAINING PROGRAM
Payer: COMMERCIAL

## 2025-01-13 ENCOUNTER — ROUTINE PRENATAL (OUTPATIENT)
Dept: OBGYN | Facility: CLINIC | Age: 40
End: 2025-01-13
Payer: COMMERCIAL

## 2025-01-13 ENCOUNTER — APPOINTMENT (OUTPATIENT)
Dept: OBGYN | Facility: CLINIC | Age: 40
End: 2025-01-13
Payer: COMMERCIAL

## 2025-01-13 VITALS — WEIGHT: 176 LBS | DIASTOLIC BLOOD PRESSURE: 74 MMHG | BODY MASS INDEX: 32.19 KG/M2 | SYSTOLIC BLOOD PRESSURE: 125 MMHG

## 2025-01-13 DIAGNOSIS — O10.919 CHRONIC HYPERTENSION AFFECTING PREGNANCY: ICD-10-CM

## 2025-01-13 PROCEDURE — 3078F DIAST BP <80 MM HG: CPT | Performed by: STUDENT IN AN ORGANIZED HEALTH CARE EDUCATION/TRAINING PROGRAM

## 2025-01-13 PROCEDURE — 87081 CULTURE SCREEN ONLY: CPT

## 2025-01-13 PROCEDURE — 87150 DNA/RNA AMPLIFIED PROBE: CPT

## 2025-01-13 PROCEDURE — 3074F SYST BP LT 130 MM HG: CPT | Performed by: STUDENT IN AN ORGANIZED HEALTH CARE EDUCATION/TRAINING PROGRAM

## 2025-01-13 PROCEDURE — 0502F SUBSEQUENT PRENATAL CARE: CPT | Performed by: STUDENT IN AN ORGANIZED HEALTH CARE EDUCATION/TRAINING PROGRAM

## 2025-01-13 PROCEDURE — 59025 FETAL NON-STRESS TEST: CPT | Performed by: STUDENT IN AN ORGANIZED HEALTH CARE EDUCATION/TRAINING PROGRAM

## 2025-01-13 ASSESSMENT — FIBROSIS 4 INDEX: FIB4 SCORE: .9095085938862486507

## 2025-01-13 NOTE — PROGRESS NOTES
OB Visit Note - 36w4d     Pregnancy complicated by:  Patient Active Problem List   Diagnosis    History of anxiety and depression    History of asthma    History of PCOS    Primigravida of advanced maternal age in second trimester    Chronic hypertension affecting pregnancy    GDM, class A1         SUBJECTIVE:  Josephine Marie is a 39 y.o.,  at 36w4d who presents for pregnancy follow-up and NST. She states the baby is moving. She has IOL scheduled at 37+0 weeks. cHTN on labetalol  with recent increase in labetalol from 100mg TID to 150mg TID. Has IOL scheduled on     ROS:  She denies vaginal bleeding, leakage of fluid, or contractions.    She denies nausea or vomiting or headache    OBJECTIVE:  Vital Signs: /74   Wt 176 lb   LMP 2024 (Exact Date)   BMI 32.19 kg/m²   Appearance/Psychiatric: to be in no distress  Constitutional: The patient is well nourished.  Respiratory:Respiratory effort is normal.  Gastrointestinal: Abdomen is soft, gravid, non-tendern,no rashes, heart tones are present,   Extremities: no edema  See NST for heart rate  GBS collected     1. Chronic hypertension affecting pregnancy  GRP B STREP, BY PCR (JERRY BROTH)        Josephine Marie is a 39 y.o. female  at 36w4d here for NST and MICHAEL. Doing well. IOL scheduled on 25. Has 1 more NST scheduled. Does not want cervix exam at next visit. Return precautions reviewed for DFM, labor or pre-e.      The patient will follow up in 5 days for NST. She was counseled to call or return for vaginal bleeding, regular contractions, leakage of fluid or decreased fetal movement.  Dinora Taylor DO, FACOG  [facility]

## 2025-01-13 NOTE — NON-PROVIDER
Pt here for OB f/u   GBS today   Pt is scheduled for an IOL on 1/18/25 at 10:00 am.   Pt aware   Reports GFM

## 2025-01-13 NOTE — PROCEDURES
NST Note  Josephine Marie, jennie  at 36w4d     Indication: AMA< cHTN on medication.    NST Interpretation:  Baseline 130, moderate mobility, positive accelerations, no decels, irregular uterine contractions,     Reactive tracing.   Of note, the patient's first blood pressure was elevated at 163/97 all subsequent blood pressures have been normal.     Dinora Taylor D.O.

## 2025-01-14 LAB — GP B STREP DNA SPEC QL NAA+PROBE: POSITIVE

## 2025-01-16 ENCOUNTER — OFFICE VISIT (OUTPATIENT)
Dept: OBGYN | Facility: CLINIC | Age: 40
End: 2025-01-16
Payer: COMMERCIAL

## 2025-01-16 ENCOUNTER — HOSPITAL ENCOUNTER (EMERGENCY)
Facility: MEDICAL CENTER | Age: 40
End: 2025-01-16
Attending: STUDENT IN AN ORGANIZED HEALTH CARE EDUCATION/TRAINING PROGRAM | Admitting: STUDENT IN AN ORGANIZED HEALTH CARE EDUCATION/TRAINING PROGRAM
Payer: COMMERCIAL

## 2025-01-16 VITALS
SYSTOLIC BLOOD PRESSURE: 122 MMHG | BODY MASS INDEX: 32.39 KG/M2 | DIASTOLIC BLOOD PRESSURE: 70 MMHG | RESPIRATION RATE: 18 BRPM | WEIGHT: 176 LBS | TEMPERATURE: 98 F | OXYGEN SATURATION: 97 % | HEIGHT: 62 IN | HEART RATE: 74 BPM

## 2025-01-16 DIAGNOSIS — Z87.42 HISTORY OF PCOS: ICD-10-CM

## 2025-01-16 DIAGNOSIS — O10.919 CHRONIC HYPERTENSION AFFECTING PREGNANCY: ICD-10-CM

## 2025-01-16 DIAGNOSIS — O24.410 GDM, CLASS A1: ICD-10-CM

## 2025-01-16 DIAGNOSIS — O09.512 PRIMIGRAVIDA OF ADVANCED MATERNAL AGE IN SECOND TRIMESTER: ICD-10-CM

## 2025-01-16 DIAGNOSIS — Z86.59 HISTORY OF ANXIETY: ICD-10-CM

## 2025-01-16 DIAGNOSIS — O13.3 GESTATIONAL HYPERTENSION, THIRD TRIMESTER: ICD-10-CM

## 2025-01-16 DIAGNOSIS — Z87.09 HISTORY OF ASTHMA: ICD-10-CM

## 2025-01-16 LAB
ALBUMIN SERPL BCP-MCNC: 3.3 G/DL (ref 3.2–4.9)
ALBUMIN/GLOB SERPL: 1.1 G/DL
ALP SERPL-CCNC: 89 U/L (ref 30–99)
ALT SERPL-CCNC: 12 U/L (ref 2–50)
ANION GAP SERPL CALC-SCNC: 11 MMOL/L (ref 7–16)
AST SERPL-CCNC: 21 U/L (ref 12–45)
BASOPHILS # BLD AUTO: 0.5 % (ref 0–1.8)
BASOPHILS # BLD: 0.06 K/UL (ref 0–0.12)
BILIRUB SERPL-MCNC: <0.2 MG/DL (ref 0.1–1.5)
BUN SERPL-MCNC: 14 MG/DL (ref 8–22)
CALCIUM ALBUM COR SERPL-MCNC: 10.4 MG/DL (ref 8.5–10.5)
CALCIUM SERPL-MCNC: 9.8 MG/DL (ref 8.5–10.5)
CHLORIDE SERPL-SCNC: 104 MMOL/L (ref 96–112)
CO2 SERPL-SCNC: 20 MMOL/L (ref 20–33)
CREAT SERPL-MCNC: 0.75 MG/DL (ref 0.5–1.4)
CREAT UR-MCNC: 29.2 MG/DL
EOSINOPHIL # BLD AUTO: 0.14 K/UL (ref 0–0.51)
EOSINOPHIL NFR BLD: 1.3 % (ref 0–6.9)
ERYTHROCYTE [DISTWIDTH] IN BLOOD BY AUTOMATED COUNT: 42.7 FL (ref 35.9–50)
GFR SERPLBLD CREATININE-BSD FMLA CKD-EPI: 103 ML/MIN/1.73 M 2
GLOBULIN SER CALC-MCNC: 3 G/DL (ref 1.9–3.5)
GLUCOSE SERPL-MCNC: 78 MG/DL (ref 65–99)
HCT VFR BLD AUTO: 36.7 % (ref 37–47)
HGB BLD-MCNC: 11.8 G/DL (ref 12–16)
IMM GRANULOCYTES # BLD AUTO: 0.08 K/UL (ref 0–0.11)
IMM GRANULOCYTES NFR BLD AUTO: 0.7 % (ref 0–0.9)
LYMPHOCYTES # BLD AUTO: 2.96 K/UL (ref 1–4.8)
LYMPHOCYTES NFR BLD: 26.8 % (ref 22–41)
MCH RBC QN AUTO: 28.5 PG (ref 27–33)
MCHC RBC AUTO-ENTMCNC: 32.2 G/DL (ref 32.2–35.5)
MCV RBC AUTO: 88.6 FL (ref 81.4–97.8)
MONOCYTES # BLD AUTO: 0.7 K/UL (ref 0–0.85)
MONOCYTES NFR BLD AUTO: 6.3 % (ref 0–13.4)
NEUTROPHILS # BLD AUTO: 7.12 K/UL (ref 1.82–7.42)
NEUTROPHILS NFR BLD: 64.4 % (ref 44–72)
NRBC # BLD AUTO: 0 K/UL
NRBC BLD-RTO: 0 /100 WBC (ref 0–0.2)
PLATELET # BLD AUTO: 243 K/UL (ref 164–446)
PMV BLD AUTO: 11.3 FL (ref 9–12.9)
POTASSIUM SERPL-SCNC: 4 MMOL/L (ref 3.6–5.5)
PROT SERPL-MCNC: 6.3 G/DL (ref 6–8.2)
PROT UR-MCNC: <6 MG/DL (ref 0–15)
PROT/CREAT UR: 205 MG/G (ref 10–107)
RBC # BLD AUTO: 4.14 M/UL (ref 4.2–5.4)
SODIUM SERPL-SCNC: 135 MMOL/L (ref 135–145)
WBC # BLD AUTO: 11.1 K/UL (ref 4.8–10.8)

## 2025-01-16 PROCEDURE — 36415 COLL VENOUS BLD VENIPUNCTURE: CPT

## 2025-01-16 PROCEDURE — 59025 FETAL NON-STRESS TEST: CPT

## 2025-01-16 PROCEDURE — 59025 FETAL NON-STRESS TEST: CPT | Performed by: FAMILY MEDICINE

## 2025-01-16 PROCEDURE — 0502F SUBSEQUENT PRENATAL CARE: CPT | Performed by: FAMILY MEDICINE

## 2025-01-16 PROCEDURE — 80053 COMPREHEN METABOLIC PANEL: CPT

## 2025-01-16 PROCEDURE — 99283 EMERGENCY DEPT VISIT LOW MDM: CPT

## 2025-01-16 PROCEDURE — 84156 ASSAY OF PROTEIN URINE: CPT

## 2025-01-16 PROCEDURE — 82570 ASSAY OF URINE CREATININE: CPT

## 2025-01-16 PROCEDURE — 85025 COMPLETE CBC W/AUTO DIFF WBC: CPT

## 2025-01-16 ASSESSMENT — PAIN SCALES - GENERAL: PAINLEVEL: 0 - NO PAIN

## 2025-01-16 ASSESSMENT — FIBROSIS 4 INDEX: FIB4 SCORE: .9095085938862486507

## 2025-01-16 NOTE — ED PROVIDER NOTES
OB ED EVALUATION NOTE    SUBJECTIVE:  Josephine Marie is a 39 y.o.,  at 37w0d who presents for evaluation after high BP during her NST earlier today. She denies vaginal bleeding, leakage of fluid, and says she feels mild contractions. She states the baby is moving.     Pt has hx of chronic HTN on Labetalol 150mg TID as well as GDMA1. She went to clinic for NST today and had an elevated BP. She says she was feeling anxious about the NST and that when she is anxious her BP increases. She denies any headache.    I personally reviewed the past medical and surgical histories, as well as the problem list.  Patient Active Problem List    Diagnosis Date Noted    GDM, class A1 2024    Chronic hypertension affecting pregnancy 2024    History of anxiety and depression 2024    History of asthma 2024    History of PCOS 2024    Primigravida of advanced maternal age in second trimester 2024       OBJECTIVE:  Vital Signs:   Vitals:    25 1250   BP: 122/70   Pulse: 74   Resp:    Temp:    SpO2:      GEN: NAD  Abd: soft, NT, gravid  Ext: no edema      NST read: 135/moderate variability/+ accelerations/- decels  Gr[place]: occasional contractions with irritation lasting  seconds    LABS / IMAGING:  Results for orders placed or performed during the hospital encounter of 25   CBC WITH DIFFERENTIAL    Collection Time: 25 11:50 AM   Result Value Ref Range    WBC 11.1 (H) 4.8 - 10.8 K/uL    RBC 4.14 (L) 4.20 - 5.40 M/uL    Hemoglobin 11.8 (L) 12.0 - 16.0 g/dL    Hematocrit 36.7 (L) 37.0 - 47.0 %    MCV 88.6 81.4 - 97.8 fL    MCH 28.5 27.0 - 33.0 pg    MCHC 32.2 32.2 - 35.5 g/dL    RDW 42.7 35.9 - 50.0 fL    Platelet Count 243 164 - 446 K/uL    MPV 11.3 9.0 - 12.9 fL    Neutrophils-Polys 64.40 44.00 - 72.00 %    Lymphocytes 26.80 22.00 - 41.00 %    Monocytes 6.30 0.00 - 13.40 %    Eosinophils 1.30 0.00 - 6.90 %    Basophils 0.50 0.00 - 1.80 %    Immature Granulocytes 0.70  0.00 - 0.90 %    Nucleated RBC 0.00 0.00 - 0.20 /100 WBC    Neutrophils (Absolute) 7.12 1.82 - 7.42 K/uL    Lymphs (Absolute) 2.96 1.00 - 4.80 K/uL    Monos (Absolute) 0.70 0.00 - 0.85 K/uL    Eos (Absolute) 0.14 0.00 - 0.51 K/uL    Baso (Absolute) 0.06 0.00 - 0.12 K/uL    Immature Granulocytes (abs) 0.08 0.00 - 0.11 K/uL    NRBC (Absolute) 0.00 K/uL   Comp Metabolic Panel    Collection Time: 25 11:50 AM   Result Value Ref Range    Sodium 135 135 - 145 mmol/L    Potassium 4.0 3.6 - 5.5 mmol/L    Chloride 104 96 - 112 mmol/L    Co2 20 20 - 33 mmol/L    Anion Gap 11.0 7.0 - 16.0    Glucose 78 65 - 99 mg/dL    Bun 14 8 - 22 mg/dL    Creatinine 0.75 0.50 - 1.40 mg/dL    Calcium 9.8 8.5 - 10.5 mg/dL    Correct Calcium 10.4 8.5 - 10.5 mg/dL    AST(SGOT) 21 12 - 45 U/L    ALT(SGPT) 12 2 - 50 U/L    Alkaline Phosphatase 89 30 - 99 U/L    Total Bilirubin <0.2 0.1 - 1.5 mg/dL    Albumin 3.3 3.2 - 4.9 g/dL    Total Protein 6.3 6.0 - 8.2 g/dL    Globulin 3.0 1.9 - 3.5 g/dL    A-G Ratio 1.1 g/dL   ESTIMATED GFR    Collection Time: 25 11:50 AM   Result Value Ref Range    GFR (CKD-EPI) 103 >60 mL/min/1.73 m 2   PROTEIN/CREAT RATIO URINE    Collection Time: 25 12:06 PM   Result Value Ref Range    Total Protein, Urine <6.0 0.0 - 15.0 mg/dL    Creatinine, Random Urine 29.20 mg/dL    Protein Creatinine Ratio 205 (H) 10 - 107 mg/g       ASSESSMENT AND PLAN:  39 y.o.  at 37w0d who presented with elevated BP with history of chronic HTN. Her BP was initially elevated with SBP in the 140s but stabilized in the 110-120s without intervention. CBC and CMP were unremarkable. Protein creatinine ratio was 205. She will follow up on  as scheduled for IOL.     She was counseled to call or return for vaginal bleeding, regular contractions, leakage of fluid or decreased fetal movement.    Devorah Davila DO  PGY-1 Family Medicine Resident  [facility]

## 2025-01-16 NOTE — PROCEDURES
Non-stress Test    Josephineingrid Marie   Gestational age: 37w0d     Indication: chronic hypertension, advanced maternal age, gestational diabetes mellitus diet controlled    NST Interpretation: Reactive    Baseline: 120  moderate variability  + accels  - decels  + uterine activity    Taylor Dejesus M.D.

## 2025-01-16 NOTE — PROGRESS NOTES
Pt is here for an NST only  37w0d  Indications: cHTN, AMA, and GDMA1  IOL scheduled 1/18  Taking labetalol

## 2025-01-16 NOTE — PROGRESS NOTES
1127 - A  with EDC 25 making her 37.0 presents to L&D from the office after having elevated BP at her appointment today. Monitors on x2. BP cuff on and cycling. POC discussed and questions addressed.  1254 - Lab results and BPs called to Dr Louie, per MD reynoso to d/c after Dr Davila rounds.  1310 - Dr Davila to bedside.  1315 - Discharge instructions discussed with patient including when to return, follow up instructions, s/s of pre-e/eclampsia, and labor precautions. Pt verbalizes understanding and is comfortable to d/c home at this time. Pt ambulates from unit in apparent stable condition with all belongings.

## 2025-01-16 NOTE — PROGRESS NOTES
Non-stress Test    Josephine Marie   Gestational age: 37w0d     Indication: chronic hypertension, advanced maternal age, gestational diabetes mellitus diet controlled    NST Interpretation: Reactive    Baseline: 120  moderate variability  + accels  - decels  + uterine activity    Taylor Dejesus M.D.      During the NST patient had two elevated blood pressures of 147/91 and 149/93. Patient was sent to OB triage for further evaluation and monitoring.

## 2025-01-18 ENCOUNTER — APPOINTMENT (OUTPATIENT)
Dept: OBGYN | Facility: MEDICAL CENTER | Age: 40
End: 2025-01-18
Attending: STUDENT IN AN ORGANIZED HEALTH CARE EDUCATION/TRAINING PROGRAM
Payer: COMMERCIAL

## 2025-01-18 ENCOUNTER — HOSPITAL ENCOUNTER (INPATIENT)
Facility: MEDICAL CENTER | Age: 40
LOS: 3 days | End: 2025-01-21
Attending: STUDENT IN AN ORGANIZED HEALTH CARE EDUCATION/TRAINING PROGRAM | Admitting: STUDENT IN AN ORGANIZED HEALTH CARE EDUCATION/TRAINING PROGRAM
Payer: COMMERCIAL

## 2025-01-18 ENCOUNTER — ANESTHESIA EVENT (OUTPATIENT)
Dept: ANESTHESIOLOGY | Facility: MEDICAL CENTER | Age: 40
End: 2025-01-18

## 2025-01-18 ENCOUNTER — ANESTHESIA (OUTPATIENT)
Dept: ANESTHESIOLOGY | Facility: MEDICAL CENTER | Age: 40
End: 2025-01-18

## 2025-01-18 DIAGNOSIS — O09.512 PRIMIGRAVIDA OF ADVANCED MATERNAL AGE IN SECOND TRIMESTER: ICD-10-CM

## 2025-01-18 DIAGNOSIS — Z86.59 HISTORY OF ANXIETY: ICD-10-CM

## 2025-01-18 DIAGNOSIS — O24.410 GDM, CLASS A1: ICD-10-CM

## 2025-01-18 DIAGNOSIS — O10.919 CHRONIC HYPERTENSION AFFECTING PREGNANCY: ICD-10-CM

## 2025-01-18 LAB
BASOPHILS # BLD AUTO: 0.8 % (ref 0–1.8)
BASOPHILS # BLD: 0.08 K/UL (ref 0–0.12)
EOSINOPHIL # BLD AUTO: 0.11 K/UL (ref 0–0.51)
EOSINOPHIL NFR BLD: 1.1 % (ref 0–6.9)
ERYTHROCYTE [DISTWIDTH] IN BLOOD BY AUTOMATED COUNT: 42.4 FL (ref 35.9–50)
GLUCOSE BLD STRIP.AUTO-MCNC: 75 MG/DL (ref 65–99)
HCT VFR BLD AUTO: 35.3 % (ref 37–47)
HGB BLD-MCNC: 11.6 G/DL (ref 12–16)
HOLDING TUBE BB 8507: NORMAL
IMM GRANULOCYTES # BLD AUTO: 0.04 K/UL (ref 0–0.11)
IMM GRANULOCYTES NFR BLD AUTO: 0.4 % (ref 0–0.9)
LYMPHOCYTES # BLD AUTO: 2.7 K/UL (ref 1–4.8)
LYMPHOCYTES NFR BLD: 27.5 % (ref 22–41)
MCH RBC QN AUTO: 28.8 PG (ref 27–33)
MCHC RBC AUTO-ENTMCNC: 32.9 G/DL (ref 32.2–35.5)
MCV RBC AUTO: 87.6 FL (ref 81.4–97.8)
MONOCYTES # BLD AUTO: 0.66 K/UL (ref 0–0.85)
MONOCYTES NFR BLD AUTO: 6.7 % (ref 0–13.4)
NEUTROPHILS # BLD AUTO: 6.22 K/UL (ref 1.82–7.42)
NEUTROPHILS NFR BLD: 63.5 % (ref 44–72)
NRBC # BLD AUTO: 0 K/UL
NRBC BLD-RTO: 0 /100 WBC (ref 0–0.2)
PLATELET # BLD AUTO: 234 K/UL (ref 164–446)
PMV BLD AUTO: 11.8 FL (ref 9–12.9)
RBC # BLD AUTO: 4.03 M/UL (ref 4.2–5.4)
T PALLIDUM AB SER QL IA: NORMAL
WBC # BLD AUTO: 9.8 K/UL (ref 4.8–10.8)

## 2025-01-18 PROCEDURE — A9270 NON-COVERED ITEM OR SERVICE: HCPCS | Performed by: ADVANCED PRACTICE MIDWIFE

## 2025-01-18 PROCEDURE — 700102 HCHG RX REV CODE 250 W/ 637 OVERRIDE(OP): Performed by: ADVANCED PRACTICE MIDWIFE

## 2025-01-18 PROCEDURE — 86780 TREPONEMA PALLIDUM: CPT

## 2025-01-18 PROCEDURE — 700101 HCHG RX REV CODE 250

## 2025-01-18 PROCEDURE — 85025 COMPLETE CBC W/AUTO DIFF WBC: CPT

## 2025-01-18 PROCEDURE — 82962 GLUCOSE BLOOD TEST: CPT

## 2025-01-18 PROCEDURE — 700102 HCHG RX REV CODE 250 W/ 637 OVERRIDE(OP)

## 2025-01-18 PROCEDURE — 770002 HCHG ROOM/CARE - OB PRIVATE (112)

## 2025-01-18 PROCEDURE — 700111 HCHG RX REV CODE 636 W/ 250 OVERRIDE (IP): Mod: JZ | Performed by: ADVANCED PRACTICE MIDWIFE

## 2025-01-18 PROCEDURE — 700105 HCHG RX REV CODE 258

## 2025-01-18 PROCEDURE — 700111 HCHG RX REV CODE 636 W/ 250 OVERRIDE (IP)

## 2025-01-18 PROCEDURE — 36415 COLL VENOUS BLD VENIPUNCTURE: CPT

## 2025-01-18 PROCEDURE — A9270 NON-COVERED ITEM OR SERVICE: HCPCS

## 2025-01-18 PROCEDURE — 700101 HCHG RX REV CODE 250: Performed by: ADVANCED PRACTICE MIDWIFE

## 2025-01-18 RX ORDER — IBUPROFEN 800 MG/1
800 TABLET, FILM COATED ORAL
Status: DISCONTINUED | OUTPATIENT
Start: 2025-01-18 | End: 2025-01-19 | Stop reason: HOSPADM

## 2025-01-18 RX ORDER — OXYTOCIN 10 [USP'U]/ML
10 INJECTION, SOLUTION INTRAMUSCULAR; INTRAVENOUS
Status: CANCELLED | OUTPATIENT
Start: 2025-01-18

## 2025-01-18 RX ORDER — SODIUM CHLORIDE, SODIUM LACTATE, POTASSIUM CHLORIDE, CALCIUM CHLORIDE 600; 310; 30; 20 MG/100ML; MG/100ML; MG/100ML; MG/100ML
INJECTION, SOLUTION INTRAVENOUS CONTINUOUS
Status: DISCONTINUED | OUTPATIENT
Start: 2025-01-18 | End: 2025-01-21 | Stop reason: HOSPADM

## 2025-01-18 RX ORDER — LIDOCAINE HYDROCHLORIDE 10 MG/ML
20 INJECTION, SOLUTION INFILTRATION; PERINEURAL
Status: CANCELLED | OUTPATIENT
Start: 2025-01-18

## 2025-01-18 RX ORDER — ONDANSETRON 4 MG/1
4 TABLET, ORALLY DISINTEGRATING ORAL EVERY 6 HOURS PRN
Status: DISCONTINUED | OUTPATIENT
Start: 2025-01-18 | End: 2025-01-19 | Stop reason: HOSPADM

## 2025-01-18 RX ORDER — TERBUTALINE SULFATE 1 MG/ML
0.25 INJECTION, SOLUTION SUBCUTANEOUS
Status: CANCELLED | OUTPATIENT
Start: 2025-01-18

## 2025-01-18 RX ORDER — LIDOCAINE HYDROCHLORIDE 10 MG/ML
20 INJECTION, SOLUTION INFILTRATION; PERINEURAL
Status: DISCONTINUED | OUTPATIENT
Start: 2025-01-18 | End: 2025-01-19 | Stop reason: HOSPADM

## 2025-01-18 RX ORDER — OXYTOCIN 10 [USP'U]/ML
10 INJECTION, SOLUTION INTRAMUSCULAR; INTRAVENOUS
Status: DISCONTINUED | OUTPATIENT
Start: 2025-01-18 | End: 2025-01-19 | Stop reason: HOSPADM

## 2025-01-18 RX ORDER — ACETAMINOPHEN 500 MG
1000 TABLET ORAL
Status: CANCELLED | OUTPATIENT
Start: 2025-01-18

## 2025-01-18 RX ORDER — TERBUTALINE SULFATE 1 MG/ML
0.25 INJECTION, SOLUTION SUBCUTANEOUS
Status: DISCONTINUED | OUTPATIENT
Start: 2025-01-18 | End: 2025-01-19 | Stop reason: HOSPADM

## 2025-01-18 RX ORDER — LIDOCAINE HYDROCHLORIDE 20 MG/ML
JELLY TOPICAL ONCE
Status: COMPLETED | OUTPATIENT
Start: 2025-01-18 | End: 2025-01-18

## 2025-01-18 RX ORDER — IBUPROFEN 800 MG/1
800 TABLET, FILM COATED ORAL
Status: CANCELLED | OUTPATIENT
Start: 2025-01-18

## 2025-01-18 RX ORDER — ALPRAZOLAM 0.25 MG/1
0.25 TABLET ORAL ONCE
Status: COMPLETED | OUTPATIENT
Start: 2025-01-18 | End: 2025-01-18

## 2025-01-18 RX ORDER — ONDANSETRON 2 MG/ML
4 INJECTION INTRAMUSCULAR; INTRAVENOUS EVERY 6 HOURS PRN
Status: DISCONTINUED | OUTPATIENT
Start: 2025-01-18 | End: 2025-01-19 | Stop reason: HOSPADM

## 2025-01-18 RX ORDER — LABETALOL 100 MG/1
200 TABLET, FILM COATED ORAL EVERY 8 HOURS
Status: DISCONTINUED | OUTPATIENT
Start: 2025-01-18 | End: 2025-01-20

## 2025-01-18 RX ORDER — ACETAMINOPHEN 500 MG
1000 TABLET ORAL
Status: DISCONTINUED | OUTPATIENT
Start: 2025-01-18 | End: 2025-01-19 | Stop reason: HOSPADM

## 2025-01-18 RX ORDER — HYDROXYZINE HYDROCHLORIDE 50 MG/1
50 TABLET, FILM COATED ORAL ONCE
Status: COMPLETED | OUTPATIENT
Start: 2025-01-18 | End: 2025-01-18

## 2025-01-18 RX ORDER — SODIUM CHLORIDE, SODIUM LACTATE, POTASSIUM CHLORIDE, CALCIUM CHLORIDE 600; 310; 30; 20 MG/100ML; MG/100ML; MG/100ML; MG/100ML
INJECTION, SOLUTION INTRAVENOUS CONTINUOUS
Status: CANCELLED | OUTPATIENT
Start: 2025-01-18

## 2025-01-18 RX ORDER — HYDROXYZINE HYDROCHLORIDE 25 MG/1
25 TABLET, FILM COATED ORAL 3 TIMES DAILY PRN
Status: DISCONTINUED | OUTPATIENT
Start: 2025-01-18 | End: 2025-01-18

## 2025-01-18 RX ADMIN — HYDROXYZINE HYDROCHLORIDE 50 MG: 50 TABLET ORAL at 21:44

## 2025-01-18 RX ADMIN — LABETALOL HYDROCHLORIDE 200 MG: 100 TABLET, FILM COATED ORAL at 14:37

## 2025-01-18 RX ADMIN — WATER 2.5 MILLION UNITS: 1 INJECTION INTRAMUSCULAR; INTRAVENOUS; SUBCUTANEOUS at 17:39

## 2025-01-18 RX ADMIN — FAMOTIDINE 20 MG: 10 INJECTION, SOLUTION INTRAVENOUS at 21:41

## 2025-01-18 RX ADMIN — MISOPROSTOL 25 MCG: 100 TABLET ORAL at 13:40

## 2025-01-18 RX ADMIN — LABETALOL HYDROCHLORIDE 200 MG: 100 TABLET, FILM COATED ORAL at 21:41

## 2025-01-18 RX ADMIN — ALPRAZOLAM 0.25 MG: 0.25 TABLET ORAL at 20:24

## 2025-01-18 RX ADMIN — LIDOCAINE HYDROCHLORIDE 2 %: 20 JELLY TOPICAL at 20:24

## 2025-01-18 RX ADMIN — SODIUM CHLORIDE, POTASSIUM CHLORIDE, SODIUM LACTATE AND CALCIUM CHLORIDE: 600; 310; 30; 20 INJECTION, SOLUTION INTRAVENOUS at 13:35

## 2025-01-18 RX ADMIN — SODIUM CHLORIDE 5 MILLION UNITS: 900 INJECTION INTRAVENOUS at 13:38

## 2025-01-18 SDOH — ECONOMIC STABILITY: TRANSPORTATION INSECURITY
IN THE PAST 12 MONTHS, HAS LACK OF RELIABLE TRANSPORTATION KEPT YOU FROM MEDICAL APPOINTMENTS, MEETINGS, WORK OR FROM GETTING THINGS NEEDED FOR DAILY LIVING?: NO

## 2025-01-18 SDOH — ECONOMIC STABILITY: TRANSPORTATION INSECURITY
IN THE PAST 12 MONTHS, HAS THE LACK OF TRANSPORTATION KEPT YOU FROM MEDICAL APPOINTMENTS OR FROM GETTING MEDICATIONS?: NO

## 2025-01-18 ASSESSMENT — SOCIAL DETERMINANTS OF HEALTH (SDOH)
WITHIN THE LAST YEAR, HAVE YOU BEEN HUMILIATED OR EMOTIONALLY ABUSED IN OTHER WAYS BY YOUR PARTNER OR EX-PARTNER?: NO
IN THE PAST 12 MONTHS, HAS THE ELECTRIC, GAS, OIL, OR WATER COMPANY THREATENED TO SHUT OFF SERVICE IN YOUR HOME?: NO
WITHIN THE LAST YEAR, HAVE YOU BEEN AFRAID OF YOUR PARTNER OR EX-PARTNER?: NO
WITHIN THE PAST 12 MONTHS, YOU WORRIED THAT YOUR FOOD WOULD RUN OUT BEFORE YOU GOT THE MONEY TO BUY MORE: NEVER TRUE
WITHIN THE PAST 12 MONTHS, THE FOOD YOU BOUGHT JUST DIDN'T LAST AND YOU DIDN'T HAVE MONEY TO GET MORE: NEVER TRUE
WITHIN THE LAST YEAR, HAVE YOU BEEN KICKED, HIT, SLAPPED, OR OTHERWISE PHYSICALLY HURT BY YOUR PARTNER OR EX-PARTNER?: NO
WITHIN THE LAST YEAR, HAVE TO BEEN RAPED OR FORCED TO HAVE ANY KIND OF SEXUAL ACTIVITY BY YOUR PARTNER OR EX-PARTNER?: NO

## 2025-01-18 ASSESSMENT — PATIENT HEALTH QUESTIONNAIRE - PHQ9
1. LITTLE INTEREST OR PLEASURE IN DOING THINGS: NOT AT ALL
2. FEELING DOWN, DEPRESSED, IRRITABLE, OR HOPELESS: NOT AT ALL
SUM OF ALL RESPONSES TO PHQ9 QUESTIONS 1 AND 2: 0

## 2025-01-18 ASSESSMENT — FIBROSIS 4 INDEX: FIB4 SCORE: 0.97

## 2025-01-18 ASSESSMENT — PAIN DESCRIPTION - PAIN TYPE
TYPE: ACUTE PAIN

## 2025-01-18 ASSESSMENT — LIFESTYLE VARIABLES: ALCOHOL_USE: NO

## 2025-01-18 NOTE — CARE PLAN
The patient is Stable - Low risk of patient condition declining or worsening         Problem: Risk for Infection and Impaired Wound Healing  Goal: Patient will remain free from infection  Outcome: Progressing  Note: Hand hygiene before and after pt care.       Problem: Pain  Goal: Patient's pain will be alleviated or reduced to the patient’s comfort goal  Outcome: Progressing  Note: Pt currently has no c/o pain, but would like an epidural at some point and will verbalize when ready.  Pt has been educated and knows all options available

## 2025-01-18 NOTE — PROGRESS NOTES
"1037-TOCO and US on.  VSS.  Pt presents to L&D for her IOL at 37.2 weeks.  Pt has no obstetrical complaints.  1115-SVE=closed, unable to assess fetal presentation.  Dr. Louie and Dr. Davila updated on exam.  MD also made aware of pt being bloodleess-BLOODLESS entered into allergy section, pt has \"no blood\" armband and sticker applied to chart.  1311-Pt consented for bloodles medicine and surgery program by Dr. Davila. 1340-Misoprostol placed. 1500-Pt is wearing a freestyle devonte 3,  FSBS done=75, Devonte=81.  MD okay to use readings from the Devonte.  1615-BS report given to Shanice CHAPMAN-pt care assumed  "

## 2025-01-18 NOTE — H&P
OB H&P:    CC: IOL for chronic HTN, GDMA1    HPI:  Ms. Josephine Marie is a 39 y.o.  @ 37w2d by LMP for induction of labor. She has hx of chronic HTN. She is on Labetalol 200mg BID, however she says she will usually only take 100mg in the AM because it makes her tired. Her only complications this pregnancy have been chronic HTN and GDM diet controlled. She wishes to have an epidural.     Contractions: Yes , pt states she occasionally feels contractions but they are not strong or regular  Loss of fluid: No   Vaginal bleeding: No   Fetal movement: present      Prenatal Care with:  [facility]    Complications this pregnancy:  GDMA1  Chronic HTN  AMA    Prenatal Labs:  Rh+, Rubella Immune, HIV neg, TrepAb neg, HBsAg NR, Hep C Ab NR, GC/CT neg/neg  Glucola: 1hr elevated at 219, 2hr 189, 3hr 99  GBS +      ROS:  Const: denies fevers, general concerns  CV/resp: reports no concerns  GI: denies abd pain, GI concerns  : see HPI  Neuro: denies HA/vision changes    OB History    Para Term  AB Living   1 0 0 0 0 0   SAB IAB Ectopic Molar Multiple Live Births   0 0 0 0 0 0      # Outcome Date GA Lbr Rodo/2nd Weight Sex Type Anes PTL Lv   1 Current                GYN: denies STIs, no cervical procedures    No past medical history on file.    Past Surgical History:   Procedure Laterality Date    DENTAL SURGERY      wisdom       No current facility-administered medications on file prior to encounter.     Current Outpatient Medications on File Prior to Encounter   Medication Sig Dispense Refill    Lancets 1 Units 4 times a day. Lancets order: Lancets for Abbott Channelview Lite meter. Sig: use 4 times daily and prn ssx high or low sugar. #100 RF x 3 100 Each 8    labetalol (NORMODYNE) 200 MG Tab Take 1 Tablet by mouth every 8 hours. 90 Tablet 0    Continuous Glucose Monitor Sup Misc 1 Applicator 4 times a day. 4 Each 10    Continuous Glucose Transmitter Misc 1 Units four times daily - before meals  and nightly as needed (4 times). 1 Units 0    aspirin 81 MG EC tablet Take 1 Tablet by mouth every day.      Prenatal MV-Min-Fe Fum-FA-DHA (PRENATAL 1 PO) Take  by mouth.         Family History   Problem Relation Age of Onset    Cancer Mother         cervical    Heart Disease Father     No Known Problems Sister     Heart Disease Maternal Grandmother     Obesity Maternal Grandmother     Heart Disease Maternal Grandfather        Social History     Tobacco Use    Smoking status: Never    Smokeless tobacco: Never   Vaping Use    Vaping status: Never Used   Substance Use Topics    Alcohol use: Not Currently     Comment: occ    Drug use: Not Currently     Types: Marijuana     Comment: occ, gummies x 1         PE:   There were no vitals filed for this visit.  gen: AAO, NAD  abd: soft, gravid, NT  Ext: NT,  edema    SVE: closed, thick, high @ 1130  FHT: 135/moderate variability/+ accels/ - decels      A/P: 39 y.o.  @ 37w2d by LMP for IOL.    -Admit to Labor and Delivery  -Fetal wellbeing: Cat I  -Continuous external fetal monitoring and tocometer  -Labor induction   -Patient wishes to have epidural for pain management   -Plan for normal vaginal delivery  -Contraception options discussed, patient plans to decide at a later time    #GBS Positive  Pt had positive test, will start Penicillin      Devorah Davila,   PGY-1 Family Medicine Resident  [facility]

## 2025-01-19 ENCOUNTER — ANESTHESIA EVENT (OUTPATIENT)
Dept: OBGYN | Facility: MEDICAL CENTER | Age: 40
End: 2025-01-19
Payer: COMMERCIAL

## 2025-01-19 ENCOUNTER — ANESTHESIA (OUTPATIENT)
Dept: OBGYN | Facility: MEDICAL CENTER | Age: 40
End: 2025-01-19
Payer: COMMERCIAL

## 2025-01-19 PROBLEM — Z87.898 HISTORY OF SEXUAL VIOLENCE: Status: ACTIVE | Noted: 2025-01-19

## 2025-01-19 PROBLEM — O09.512 PRIMIGRAVIDA OF ADVANCED MATERNAL AGE IN SECOND TRIMESTER: Status: RESOLVED | Noted: 2024-08-02 | Resolved: 2025-01-19

## 2025-01-19 PROBLEM — Z78.9 REFUSAL OF BLOOD PRODUCT: Status: ACTIVE | Noted: 2025-01-19

## 2025-01-19 LAB
ERYTHROCYTE [DISTWIDTH] IN BLOOD BY AUTOMATED COUNT: 42.7 FL (ref 35.9–50)
HCT VFR BLD AUTO: 32.9 % (ref 37–47)
HGB BLD-MCNC: 10.6 G/DL (ref 12–16)
MCH RBC QN AUTO: 28.6 PG (ref 27–33)
MCHC RBC AUTO-ENTMCNC: 32.2 G/DL (ref 32.2–35.5)
MCV RBC AUTO: 88.7 FL (ref 81.4–97.8)
PLATELET # BLD AUTO: 204 K/UL (ref 164–446)
PMV BLD AUTO: 11.1 FL (ref 9–12.9)
RBC # BLD AUTO: 3.71 M/UL (ref 4.2–5.4)
WBC # BLD AUTO: 17 K/UL (ref 4.8–10.8)

## 2025-01-19 PROCEDURE — 160029 HCHG SURGERY MINUTES - 1ST 30 MINS LEVEL 4: Performed by: STUDENT IN AN ORGANIZED HEALTH CARE EDUCATION/TRAINING PROGRAM

## 2025-01-19 PROCEDURE — 700102 HCHG RX REV CODE 250 W/ 637 OVERRIDE(OP): Performed by: ANESTHESIOLOGY

## 2025-01-19 PROCEDURE — 700111 HCHG RX REV CODE 636 W/ 250 OVERRIDE (IP): Performed by: ANESTHESIOLOGY

## 2025-01-19 PROCEDURE — 59510 CESAREAN DELIVERY: CPT | Performed by: NURSE PRACTITIONER

## 2025-01-19 PROCEDURE — A9270 NON-COVERED ITEM OR SERVICE: HCPCS

## 2025-01-19 PROCEDURE — 700111 HCHG RX REV CODE 636 W/ 250 OVERRIDE (IP): Mod: JZ | Performed by: STUDENT IN AN ORGANIZED HEALTH CARE EDUCATION/TRAINING PROGRAM

## 2025-01-19 PROCEDURE — 85027 COMPLETE CBC AUTOMATED: CPT

## 2025-01-19 PROCEDURE — C1755 CATHETER, INTRASPINAL: HCPCS | Performed by: STUDENT IN AN ORGANIZED HEALTH CARE EDUCATION/TRAINING PROGRAM

## 2025-01-19 PROCEDURE — 700105 HCHG RX REV CODE 258: Performed by: OBSTETRICS & GYNECOLOGY

## 2025-01-19 PROCEDURE — 160035 HCHG PACU - 1ST 60 MINS PHASE I: Performed by: STUDENT IN AN ORGANIZED HEALTH CARE EDUCATION/TRAINING PROGRAM

## 2025-01-19 PROCEDURE — 10H073Z INSERTION OF MONITORING ELECTRODE INTO PRODUCTS OF CONCEPTION, VIA NATURAL OR ARTIFICIAL OPENING: ICD-10-PCS | Performed by: STUDENT IN AN ORGANIZED HEALTH CARE EDUCATION/TRAINING PROGRAM

## 2025-01-19 PROCEDURE — 700105 HCHG RX REV CODE 258: Performed by: STUDENT IN AN ORGANIZED HEALTH CARE EDUCATION/TRAINING PROGRAM

## 2025-01-19 PROCEDURE — 700105 HCHG RX REV CODE 258: Performed by: ANESTHESIOLOGY

## 2025-01-19 PROCEDURE — 700101 HCHG RX REV CODE 250: Performed by: ANESTHESIOLOGY

## 2025-01-19 PROCEDURE — 700102 HCHG RX REV CODE 250 W/ 637 OVERRIDE(OP)

## 2025-01-19 PROCEDURE — 770002 HCHG ROOM/CARE - OB PRIVATE (112)

## 2025-01-19 PROCEDURE — 160009 HCHG ANES TIME/MIN: Performed by: STUDENT IN AN ORGANIZED HEALTH CARE EDUCATION/TRAINING PROGRAM

## 2025-01-19 PROCEDURE — 160002 HCHG RECOVERY MINUTES (STAT): Performed by: STUDENT IN AN ORGANIZED HEALTH CARE EDUCATION/TRAINING PROGRAM

## 2025-01-19 PROCEDURE — 4A1H74Z MONITORING OF PRODUCTS OF CONCEPTION, CARDIAC ELECTRICAL ACTIVITY, VIA NATURAL OR ARTIFICIAL OPENING: ICD-10-PCS | Performed by: STUDENT IN AN ORGANIZED HEALTH CARE EDUCATION/TRAINING PROGRAM

## 2025-01-19 PROCEDURE — 36415 COLL VENOUS BLD VENIPUNCTURE: CPT

## 2025-01-19 PROCEDURE — A9270 NON-COVERED ITEM OR SERVICE: HCPCS | Performed by: ANESTHESIOLOGY

## 2025-01-19 PROCEDURE — 160048 HCHG OR STATISTICAL LEVEL 1-5: Performed by: STUDENT IN AN ORGANIZED HEALTH CARE EDUCATION/TRAINING PROGRAM

## 2025-01-19 PROCEDURE — 160041 HCHG SURGERY MINUTES - EA ADDL 1 MIN LEVEL 4: Performed by: STUDENT IN AN ORGANIZED HEALTH CARE EDUCATION/TRAINING PROGRAM

## 2025-01-19 RX ORDER — IBUPROFEN 800 MG/1
800 TABLET, FILM COATED ORAL EVERY 8 HOURS
Status: DISCONTINUED | OUTPATIENT
Start: 2025-01-20 | End: 2025-01-21 | Stop reason: HOSPADM

## 2025-01-19 RX ORDER — CALCIUM CARBONATE 500 MG/1
1000 TABLET, CHEWABLE ORAL EVERY 6 HOURS PRN
Status: DISCONTINUED | OUTPATIENT
Start: 2025-01-19 | End: 2025-01-21 | Stop reason: HOSPADM

## 2025-01-19 RX ORDER — ROPIVACAINE HYDROCHLORIDE 2 MG/ML
INJECTION, SOLUTION EPIDURAL; INFILTRATION; PERINEURAL CONTINUOUS
Status: DISCONTINUED | OUTPATIENT
Start: 2025-01-19 | End: 2025-01-21 | Stop reason: HOSPADM

## 2025-01-19 RX ORDER — OXYCODONE HYDROCHLORIDE 10 MG/1
10 TABLET ORAL EVERY 4 HOURS PRN
Status: DISCONTINUED | OUTPATIENT
Start: 2025-01-20 | End: 2025-01-21 | Stop reason: HOSPADM

## 2025-01-19 RX ORDER — DIPHENHYDRAMINE HCL 25 MG
25 TABLET ORAL EVERY 6 HOURS PRN
Status: DISCONTINUED | OUTPATIENT
Start: 2025-01-20 | End: 2025-01-21 | Stop reason: HOSPADM

## 2025-01-19 RX ORDER — OXYCODONE HYDROCHLORIDE 5 MG/1
5 TABLET ORAL EVERY 4 HOURS PRN
Status: ACTIVE | OUTPATIENT
Start: 2025-01-19 | End: 2025-01-20

## 2025-01-19 RX ORDER — ACETAMINOPHEN 500 MG
1000 TABLET ORAL EVERY 6 HOURS
Status: DISPENSED | OUTPATIENT
Start: 2025-01-19 | End: 2025-01-20

## 2025-01-19 RX ORDER — DIPHENHYDRAMINE HYDROCHLORIDE 50 MG/ML
25 INJECTION INTRAMUSCULAR; INTRAVENOUS EVERY 6 HOURS PRN
Status: DISCONTINUED | OUTPATIENT
Start: 2025-01-20 | End: 2025-01-21 | Stop reason: HOSPADM

## 2025-01-19 RX ORDER — SODIUM CHLORIDE, SODIUM LACTATE, POTASSIUM CHLORIDE, AND CALCIUM CHLORIDE .6; .31; .03; .02 G/100ML; G/100ML; G/100ML; G/100ML
1000 INJECTION, SOLUTION INTRAVENOUS ONCE
Status: COMPLETED | OUTPATIENT
Start: 2025-01-19 | End: 2025-01-19

## 2025-01-19 RX ORDER — KETOROLAC TROMETHAMINE 15 MG/ML
15 INJECTION, SOLUTION INTRAMUSCULAR; INTRAVENOUS EVERY 6 HOURS
Status: COMPLETED | OUTPATIENT
Start: 2025-01-19 | End: 2025-01-20

## 2025-01-19 RX ORDER — SODIUM CHLORIDE, SODIUM LACTATE, POTASSIUM CHLORIDE, AND CALCIUM CHLORIDE .6; .31; .03; .02 G/100ML; G/100ML; G/100ML; G/100ML
250 INJECTION, SOLUTION INTRAVENOUS PRN
Status: DISCONTINUED | OUTPATIENT
Start: 2025-01-19 | End: 2025-01-19 | Stop reason: HOSPADM

## 2025-01-19 RX ORDER — CEFAZOLIN SODIUM 1 G/3ML
2 INJECTION, POWDER, FOR SOLUTION INTRAMUSCULAR; INTRAVENOUS ONCE
Status: DISCONTINUED | OUTPATIENT
Start: 2025-01-19 | End: 2025-01-19 | Stop reason: HOSPADM

## 2025-01-19 RX ORDER — ACETAMINOPHEN 500 MG
1000 TABLET ORAL EVERY 6 HOURS PRN
Status: DISCONTINUED | OUTPATIENT
Start: 2025-01-23 | End: 2025-01-21 | Stop reason: HOSPADM

## 2025-01-19 RX ORDER — SODIUM CHLORIDE, SODIUM LACTATE, POTASSIUM CHLORIDE, AND CALCIUM CHLORIDE .6; .31; .03; .02 G/100ML; G/100ML; G/100ML; G/100ML
1000 INJECTION, SOLUTION INTRAVENOUS
Status: DISCONTINUED | OUTPATIENT
Start: 2025-01-19 | End: 2025-01-19 | Stop reason: HOSPADM

## 2025-01-19 RX ORDER — EPHEDRINE SULFATE 50 MG/ML
10 INJECTION, SOLUTION INTRAVENOUS
Status: ACTIVE | OUTPATIENT
Start: 2025-01-19 | End: 2025-01-20

## 2025-01-19 RX ORDER — METOCLOPRAMIDE HYDROCHLORIDE 5 MG/ML
10 INJECTION INTRAMUSCULAR; INTRAVENOUS ONCE
Status: COMPLETED | OUTPATIENT
Start: 2025-01-19 | End: 2025-01-19

## 2025-01-19 RX ORDER — DOCUSATE SODIUM 100 MG/1
100 CAPSULE, LIQUID FILLED ORAL 2 TIMES DAILY PRN
Status: DISCONTINUED | OUTPATIENT
Start: 2025-01-19 | End: 2025-01-21 | Stop reason: HOSPADM

## 2025-01-19 RX ORDER — SIMETHICONE 125 MG
125 TABLET,CHEWABLE ORAL 4 TIMES DAILY PRN
Status: DISCONTINUED | OUTPATIENT
Start: 2025-01-19 | End: 2025-01-21 | Stop reason: HOSPADM

## 2025-01-19 RX ORDER — EPHEDRINE SULFATE 50 MG/ML
5 INJECTION, SOLUTION INTRAVENOUS
Status: DISCONTINUED | OUTPATIENT
Start: 2025-01-19 | End: 2025-01-19 | Stop reason: HOSPADM

## 2025-01-19 RX ORDER — KETOROLAC TROMETHAMINE 15 MG/ML
INJECTION, SOLUTION INTRAMUSCULAR; INTRAVENOUS PRN
Status: DISCONTINUED | OUTPATIENT
Start: 2025-01-19 | End: 2025-01-19 | Stop reason: SURG

## 2025-01-19 RX ORDER — SODIUM CHLORIDE, SODIUM LACTATE, POTASSIUM CHLORIDE, CALCIUM CHLORIDE 600; 310; 30; 20 MG/100ML; MG/100ML; MG/100ML; MG/100ML
2000 INJECTION, SOLUTION INTRAVENOUS PRN
Status: DISCONTINUED | OUTPATIENT
Start: 2025-01-19 | End: 2025-01-21 | Stop reason: HOSPADM

## 2025-01-19 RX ORDER — OXYCODONE HYDROCHLORIDE 10 MG/1
10 TABLET ORAL EVERY 4 HOURS PRN
Status: ACTIVE | OUTPATIENT
Start: 2025-01-19 | End: 2025-01-20

## 2025-01-19 RX ORDER — CITRIC ACID/SODIUM CITRATE 334-500MG
30 SOLUTION, ORAL ORAL ONCE
Status: COMPLETED | OUTPATIENT
Start: 2025-01-19 | End: 2025-01-19

## 2025-01-19 RX ORDER — MEPERIDINE HYDROCHLORIDE 25 MG/ML
INJECTION INTRAMUSCULAR; INTRAVENOUS; SUBCUTANEOUS PRN
Status: DISCONTINUED | OUTPATIENT
Start: 2025-01-19 | End: 2025-01-19 | Stop reason: SURG

## 2025-01-19 RX ORDER — ONDANSETRON 2 MG/ML
4 INJECTION INTRAMUSCULAR; INTRAVENOUS EVERY 6 HOURS PRN
Status: DISCONTINUED | OUTPATIENT
Start: 2025-01-19 | End: 2025-01-21 | Stop reason: HOSPADM

## 2025-01-19 RX ORDER — ONDANSETRON 4 MG/1
4 TABLET, ORALLY DISINTEGRATING ORAL EVERY 6 HOURS PRN
Status: DISCONTINUED | OUTPATIENT
Start: 2025-01-20 | End: 2025-01-19

## 2025-01-19 RX ORDER — SODIUM CHLORIDE, SODIUM LACTATE, POTASSIUM CHLORIDE, CALCIUM CHLORIDE 600; 310; 30; 20 MG/100ML; MG/100ML; MG/100ML; MG/100ML
INJECTION, SOLUTION INTRAVENOUS CONTINUOUS
Status: DISCONTINUED | OUTPATIENT
Start: 2025-01-19 | End: 2025-01-21 | Stop reason: HOSPADM

## 2025-01-19 RX ORDER — IBUPROFEN 800 MG/1
800 TABLET, FILM COATED ORAL EVERY 8 HOURS PRN
Status: DISCONTINUED | OUTPATIENT
Start: 2025-01-23 | End: 2025-01-21 | Stop reason: HOSPADM

## 2025-01-19 RX ORDER — BISACODYL 10 MG
10 SUPPOSITORY, RECTAL RECTAL PRN
Status: DISCONTINUED | OUTPATIENT
Start: 2025-01-19 | End: 2025-01-21 | Stop reason: HOSPADM

## 2025-01-19 RX ORDER — VITAMIN A ACETATE, BETA CAROTENE, ASCORBIC ACID, CHOLECALCIFEROL, .ALPHA.-TOCOPHEROL ACETATE, DL-, THIAMINE MONONITRATE, RIBOFLAVIN, NIACINAMIDE, PYRIDOXINE HYDROCHLORIDE, FOLIC ACID, CYANOCOBALAMIN, CALCIUM CARBONATE, FERROUS FUMARATE, ZINC OXIDE, CUPRIC OXIDE 3080; 12; 120; 400; 1; 1.84; 3; 20; 22; 920; 25; 200; 27; 10; 2 [IU]/1; UG/1; MG/1; [IU]/1; MG/1; MG/1; MG/1; MG/1; MG/1; [IU]/1; MG/1; MG/1; MG/1; MG/1; MG/1
1 TABLET, FILM COATED ORAL
Status: DISCONTINUED | OUTPATIENT
Start: 2025-01-19 | End: 2025-01-21 | Stop reason: HOSPADM

## 2025-01-19 RX ORDER — SODIUM CHLORIDE 9 MG/ML
INJECTION, SOLUTION INTRAVENOUS ONCE
Status: DISCONTINUED | OUTPATIENT
Start: 2025-01-19 | End: 2025-01-21 | Stop reason: HOSPADM

## 2025-01-19 RX ORDER — TRANEXAMIC ACID 100 MG/ML
INJECTION, SOLUTION INTRAVENOUS PRN
Status: DISCONTINUED | OUTPATIENT
Start: 2025-01-19 | End: 2025-01-19 | Stop reason: SURG

## 2025-01-19 RX ORDER — BUPIVACAINE HYDROCHLORIDE 7.5 MG/ML
INJECTION, SOLUTION INTRASPINAL
Status: COMPLETED | OUTPATIENT
Start: 2025-01-19 | End: 2025-01-19

## 2025-01-19 RX ORDER — ONDANSETRON 2 MG/ML
4 INJECTION INTRAMUSCULAR; INTRAVENOUS EVERY 6 HOURS PRN
Status: DISCONTINUED | OUTPATIENT
Start: 2025-01-20 | End: 2025-01-19

## 2025-01-19 RX ORDER — ONDANSETRON 2 MG/ML
INJECTION INTRAMUSCULAR; INTRAVENOUS PRN
Status: DISCONTINUED | OUTPATIENT
Start: 2025-01-19 | End: 2025-01-19 | Stop reason: SURG

## 2025-01-19 RX ORDER — MORPHINE SULFATE 0.5 MG/ML
INJECTION, SOLUTION EPIDURAL; INTRATHECAL; INTRAVENOUS
Status: COMPLETED | OUTPATIENT
Start: 2025-01-19 | End: 2025-01-19

## 2025-01-19 RX ORDER — OXYCODONE HYDROCHLORIDE 5 MG/1
5 TABLET ORAL EVERY 4 HOURS PRN
Status: DISCONTINUED | OUTPATIENT
Start: 2025-01-20 | End: 2025-01-21 | Stop reason: HOSPADM

## 2025-01-19 RX ORDER — ONDANSETRON 4 MG/1
4 TABLET, ORALLY DISINTEGRATING ORAL EVERY 6 HOURS PRN
Status: DISCONTINUED | OUTPATIENT
Start: 2025-01-19 | End: 2025-01-21 | Stop reason: HOSPADM

## 2025-01-19 RX ORDER — DEXAMETHASONE SODIUM PHOSPHATE 4 MG/ML
INJECTION, SOLUTION INTRA-ARTICULAR; INTRALESIONAL; INTRAMUSCULAR; INTRAVENOUS; SOFT TISSUE PRN
Status: DISCONTINUED | OUTPATIENT
Start: 2025-01-19 | End: 2025-01-19 | Stop reason: SURG

## 2025-01-19 RX ORDER — CEFAZOLIN SODIUM 1 G/3ML
INJECTION, POWDER, FOR SOLUTION INTRAMUSCULAR; INTRAVENOUS PRN
Status: DISCONTINUED | OUTPATIENT
Start: 2025-01-19 | End: 2025-01-19 | Stop reason: SURG

## 2025-01-19 RX ORDER — ACETAMINOPHEN 500 MG
1000 TABLET ORAL EVERY 6 HOURS
Status: DISCONTINUED | OUTPATIENT
Start: 2025-01-20 | End: 2025-01-21 | Stop reason: HOSPADM

## 2025-01-19 RX ADMIN — SODIUM CITRATE AND CITRIC ACID MONOHYDRATE 30 ML: 334; 500 SOLUTION ORAL at 05:06

## 2025-01-19 RX ADMIN — TRANEXAMIC ACID 1000 MG: 100 INJECTION, SOLUTION INTRAVENOUS at 06:12

## 2025-01-19 RX ADMIN — SODIUM CHLORIDE, POTASSIUM CHLORIDE, SODIUM LACTATE AND CALCIUM CHLORIDE: 600; 310; 30; 20 INJECTION, SOLUTION INTRAVENOUS at 05:33

## 2025-01-19 RX ADMIN — LABETALOL HYDROCHLORIDE 100 MG: 100 TABLET, FILM COATED ORAL at 14:27

## 2025-01-19 RX ADMIN — LABETALOL HYDROCHLORIDE 200 MG: 100 TABLET, FILM COATED ORAL at 22:12

## 2025-01-19 RX ADMIN — PHENYLEPHRINE HYDROCHLORIDE 0.6 MCG/KG/MIN: 10 INJECTION INTRAVENOUS at 05:35

## 2025-01-19 RX ADMIN — KETOROLAC TROMETHAMINE 15 MG: 15 INJECTION, SOLUTION INTRAMUSCULAR; INTRAVENOUS at 06:39

## 2025-01-19 RX ADMIN — ONDANSETRON 4 MG: 2 INJECTION INTRAMUSCULAR; INTRAVENOUS at 06:10

## 2025-01-19 RX ADMIN — ACETAMINOPHEN 1000 MG: 500 TABLET ORAL at 17:18

## 2025-01-19 RX ADMIN — MEPERIDINE HYDROCHLORIDE 25 MG: 25 INJECTION INTRAMUSCULAR; INTRAVENOUS; SUBCUTANEOUS at 06:17

## 2025-01-19 RX ADMIN — FAMOTIDINE 20 MG: 10 INJECTION, SOLUTION INTRAVENOUS at 05:06

## 2025-01-19 RX ADMIN — DEXAMETHASONE SODIUM PHOSPHATE 4 MG: 4 INJECTION INTRA-ARTICULAR; INTRALESIONAL; INTRAMUSCULAR; INTRAVENOUS; SOFT TISSUE at 06:10

## 2025-01-19 RX ADMIN — ACETAMINOPHEN 1000 MG: 500 TABLET ORAL at 22:12

## 2025-01-19 RX ADMIN — SODIUM CHLORIDE, POTASSIUM CHLORIDE, SODIUM LACTATE AND CALCIUM CHLORIDE 1000 ML: 600; 310; 30; 20 INJECTION, SOLUTION INTRAVENOUS at 13:17

## 2025-01-19 RX ADMIN — ONDANSETRON 4 MG: 2 INJECTION INTRAMUSCULAR; INTRAVENOUS at 10:51

## 2025-01-19 RX ADMIN — KETOROLAC TROMETHAMINE 15 MG: 15 INJECTION, SOLUTION INTRAMUSCULAR; INTRAVENOUS at 20:45

## 2025-01-19 RX ADMIN — ONDANSETRON 4 MG: 2 INJECTION INTRAMUSCULAR; INTRAVENOUS at 17:18

## 2025-01-19 RX ADMIN — OXYTOCIN 1000 ML: 10 INJECTION, SOLUTION INTRAMUSCULAR; INTRAVENOUS at 06:07

## 2025-01-19 RX ADMIN — CEFAZOLIN 2 G: 1 INJECTION, POWDER, FOR SOLUTION INTRAMUSCULAR; INTRAVENOUS at 05:51

## 2025-01-19 RX ADMIN — MORPHINE SULFATE 4850 MCG: 0.5 INJECTION EPIDURAL; INTRATHECAL; INTRAVENOUS at 06:38

## 2025-01-19 RX ADMIN — BUPIVACAINE HYDROCHLORIDE IN DEXTROSE 1.6 ML: 7.5 INJECTION, SOLUTION SUBARACHNOID at 05:35

## 2025-01-19 RX ADMIN — KETOROLAC TROMETHAMINE 15 MG: 15 INJECTION, SOLUTION INTRAMUSCULAR; INTRAVENOUS at 13:23

## 2025-01-19 RX ADMIN — METOCLOPRAMIDE 10 MG: 5 INJECTION, SOLUTION INTRAMUSCULAR; INTRAVENOUS at 05:06

## 2025-01-19 RX ADMIN — MORPHINE SULFATE 150 MCG: 0.5 INJECTION EPIDURAL; INTRATHECAL; INTRAVENOUS at 05:35

## 2025-01-19 RX ADMIN — SODIUM CHLORIDE, POTASSIUM CHLORIDE, SODIUM LACTATE AND CALCIUM CHLORIDE 1000 ML: 600; 310; 30; 20 INJECTION, SOLUTION INTRAVENOUS at 04:20

## 2025-01-19 ASSESSMENT — PAIN DESCRIPTION - PAIN TYPE
TYPE: ACUTE PAIN
TYPE: SURGICAL PAIN
TYPE: ACUTE PAIN
TYPE: ACUTE PAIN
TYPE: SURGICAL PAIN
TYPE: ACUTE PAIN
TYPE: SURGICAL PAIN
TYPE: ACUTE PAIN

## 2025-01-19 ASSESSMENT — PAIN SCALES - GENERAL: PAIN_LEVEL: 0

## 2025-01-19 NOTE — ANESTHESIA POSTPROCEDURE EVALUATION
Patient: Josephine Marie    Procedure Summary       Date: 25 Room / Location: [facility] OR 01 / SURGERY LABOR AND DELIVERY    Anesthesia Start: 533 Anesthesia Stop: 720    Procedure:  SECTION, PRIMARY (Abdomen) Diagnosis:        delivery delivered      (same, delivered)    Surgeons: Abeba Acuña M.D. Responsible Provider: Juan Melgar M.D.    Anesthesia Type: spinal ASA Status: 2            Final Anesthesia Type: spinal  Last vitals  BP   Blood Pressure: (!) 127/98    Temp   36.2 °C (97.2 °F)    Pulse   78   Resp   16    SpO2   99 %      Anesthesia Post Evaluation    Patient location during evaluation: PACU  Patient participation: complete - patient participated  Level of consciousness: awake and alert  Pain score: 0    Airway patency: patent  Anesthetic complications: no  Cardiovascular status: hemodynamically stable  Respiratory status: acceptable  Hydration status: euvolemic  Comments: Left hand has some swelling due to infiltrated IV. Seems to be improving. No significant pain.     PONV: none          No notable events documented.

## 2025-01-19 NOTE — LACTATION NOTE
Initial visit  MOB is primary c/s @ 0605 this morning,  after failed IOL. History of GDM, CHTN and AMA. 37+3 gestation  MOB reports baby has latched twice since delivery. She denies having had pain with suckling.  Discussed normal  feeding frequency of first 24 hours and cluster feeding. Encouraged skin to skin care when either parent is awake and alert and reviewed hunger cues and feeding on cue. Encouraged to call for latch assessment with next feeding.  PLAN :  Skin to skin when either parent awake and alert  Feed on cue without time limits  Call for latch assist/assessment with next feeding and prn.

## 2025-01-19 NOTE — ANESTHESIA TIME REPORT
Anesthesia Start and Stop Event Times       Date Time Event    1/19/2025 0451 Ready for Procedure     0533 Anesthesia Start     0720 Anesthesia Stop          Responsible Staff  01/19/25      Name Role Begin End    Juan Melgar M.D. Anesth 0533 0720          Overtime Reason:  no overtime (within assigned shift)    Comments:

## 2025-01-19 NOTE — DISCHARGE PLANNING
Discharge Planning Assessment Post Partum     Reviewed record and met with mother Pablo at PP bedside     Reason for Referral: Maternal history anxiety, depression, THC use   Address: [address] in [place]  Type of Living Situation:stable - lives with sister in law Danica  Mom Diagnosis: post partum  Baby Diagnosis:   Primary Language: English     Name of Baby: unsure  Father of the Baby: Abhinav Bloom  Involved in baby’s care? No - FOB incarcerated for stealing per MOB  Contact Information: n/a     Prenatal Care: Dr. Chen  Mom's PCP: none  [facility]     Support System: parents, siblings, CHELO  Coping/Bonding between mother & baby: appropriate  Source of Feeding: breast  Supplies for Infant: prepared     Mom's Insurance: [employer] Medicaid  Baby Covered on Insurance:yes  Mother Employed/School: no  Other children in the home/names & ages: first baby     Financial Hardship/Income: denies   Mom's Mental status: alert and oriented  Services used prior to admit: no     CPS History: denies  Psychiatric History: denies  Domestic Violence History: denies  Drug/ETOH History: denies any use     Resources Provided: community resources. [facility], PP support, [facility]  Referrals Made: diaper bank      Clearance for Discharge: Awaiting infant UDS      Plan: Waiting on infant UDS. [facility].

## 2025-01-19 NOTE — PROGRESS NOTES
1850 report received from Shanice POLANCO RN, bedside CGM reading 78    1950 Cooper CNM at bedside to discuss POC, orders received for lidocaine jelly and xanax (see MAR) for cervical exam/possible balloon placement    2045 Cooper bedside for SVE and balloon placement, pt did not tolerate well. Placement discontinued.     2120 Cooper CNM bedside to discuss primary c/s with pt and FOB. All questioned answered and all agreed to move forward with c/s BY maternal request in the AM. Pt NPO.    2245 Dr. Chase bedside to discuss c/s and consent    0528 pt transferred from 224 to OR 1    0533 In OR 1 at 0533    0605 delivery of a viable male 6/8, gases sent    0640 pt reported pain at IV site in R wrist, Dr. Melgar identified as infiltrated with cool to touch and swelling. US called for 2nd IV placement.    0700 report given to Cris CHAPMAN

## 2025-01-19 NOTE — ANESTHESIA PREPROCEDURE EVALUATION
Case: [id number] Date/Time: 25    Procedure:  SECTION, PRIMARY (Abdomen)    Anesthesia type: Spinal    Pre-op diagnosis: Elective    Location: [facility] OR 01 / SURGERY LABOR AND DELIVERY    Surgeons: Abeba Acuña M.D.            Relevant Problems   CARDIAC   (positive) Chronic hypertension affecting pregnancy      OB   (positive) Chronic hypertension affecting pregnancy   (positive) GDM, class A1       Physical Exam    Airway   Mallampati: II  TM distance: >3 FB  Neck ROM: full       Cardiovascular - normal exam  Rhythm: regular  Rate: normal  (-) murmur     Dental - normal exam           Pulmonary - normal exam  Breath sounds clear to auscultation     Abdominal    Neurological - normal exam                   Anesthesia Plan    ASA 2       Plan - spinal   Neuraxial block will be primary anesthetic                Postoperative Plan: Postoperative administration of opioids is intended.    Pertinent diagnostic labs and testing reviewed    Informed Consent:    Anesthetic plan and risks discussed with patient.
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
## 2025-01-19 NOTE — CARE PLAN
The patient is Stable - Low risk of patient condition declining or worsening    Shift Goals  Clinical Goals: pain control  Patient Goals: rest  Family Goals: breastfeeding    Progress made toward(s) clinical / shift goals:    Patient will verbalize any concerns for either infant or self, asking appropriate question regarding infants care needs, and understanding self care needs as well.     Patient will continue to remain physiologically stable with normal lochia in color and amount, a palpable uterine involution and stable vital signs with no symptoms.     Patient is not progressing towards the following goals:

## 2025-01-19 NOTE — PROGRESS NOTES
0700 - Assumed care of patient from Linda CHAPMAN in OR. US guided IV began by Anesthesia.   0712 - Pt into PACU for recovery. Per Dr. Melgar, Assess right arm every hour for worsening symptoms from IV infiltration such as color and pain. CGM blood sugar 72. Pt pain controlled. Infant skin to skin with patient once infant warmed.   0835 -Pt taken up to PP. Report to Jacinda CHAPMAN.

## 2025-01-19 NOTE — PROGRESS NOTES
0845: Received report from Cris Fernando. Patient transferred to postpartum bed and alert.    Patient assessment completed, plan of care reviewed and Verbalized understanding. Oriented patient to call light, paperwork, unit, room and answered all other questions. Received in report to monitor patients right arm every hour for increased swelling, color change, patients discomfort.     1046: Contacted MD regarding Medication for Nausea and vomiting, MD gave Verbal order to move up time of medication start.

## 2025-01-19 NOTE — ANESTHESIA PROCEDURE NOTES
Spinal Block    Date/Time: 1/19/2025 5:35 AM    Performed by: Juan Melgar M.D.  Authorized by: Juan Melgar M.D.    Start Time:  1/19/2025 5:35 AM  End Time:  1/19/2025 5:40 AM  Reason for Block: primary anesthetic    patient identified, IV checked, site marked, risks and benefits discussed, surgical consent, monitors and equipment checked, pre-op evaluation and timeout performed    Patient Position:  Sitting  Prep: ChloraPrep, patient draped and sterile technique    Monitoring:  Blood pressure, continuous pulse oximetry and heart rate  Approach:  Midline  Location:  L3-4  Injection Technique:  Single-shot  Skin infiltration:  Lidocaine  Strength:  1%  Dose:  3ml  Needle Type:  Pencan  Needle Gauge:  25 G  CSF flowing pre/post injection:  Yes  Sensory Level:  T4   One pass

## 2025-01-19 NOTE — PROGRESS NOTES
1615: Report received from Anita CHAPMAN. EFM tracing, VSS. Patient resting in bed, coping with contraction pain at this time. POC discussed with patient. FOB and family at bedside. Whiteboard updated. Call light at bedside, patient encouraged to call with needs or concerns. Bed in lowest and locked position. All questions answered.     1740: SVE 1cm external os, this RN unable to fully assess cervix due to patient pain during exam, requested RN remove hand before SVE could be fully assessed.     Dr. Davila updated on SVE, contraction pattern.     Orders for Pitocin received. OK for patient to walk in hallways prior to starting.     1828: Dr. Louie updated on FHR decels, tracing reviewed, per MD wait 20 minutes before starting Pitocin.     1850: Bedside report to ARI Mckeon. Care relinquished at this time.

## 2025-01-19 NOTE — CARE PLAN
The patient is Watcher - Medium risk of patient condition declining or worsening    Shift Goals  Clinical Goals: make cervical change and manage pain  Patient Goals: healthy mom and baby  Family Goals: support    Progress made toward(s) clinical / shift goals:  progressing    Problem: Risk for Injury  Goal: Patient and fetus will be free of preventable injury/complications  Outcome: Progressing  Note: VS WNL, monitor BP and continue fetal monitoring     Problem: Pain  Goal: Patient's pain will be alleviated or reduced to the patient’s comfort goal  Outcome: Progressing  Note: Discussed pain options with patient, desires epidural

## 2025-01-20 PROCEDURE — A9270 NON-COVERED ITEM OR SERVICE: HCPCS | Performed by: OBSTETRICS & GYNECOLOGY

## 2025-01-20 PROCEDURE — 700102 HCHG RX REV CODE 250 W/ 637 OVERRIDE(OP): Performed by: ANESTHESIOLOGY

## 2025-01-20 PROCEDURE — 700102 HCHG RX REV CODE 250 W/ 637 OVERRIDE(OP)

## 2025-01-20 PROCEDURE — A9270 NON-COVERED ITEM OR SERVICE: HCPCS | Performed by: NURSE PRACTITIONER

## 2025-01-20 PROCEDURE — 700102 HCHG RX REV CODE 250 W/ 637 OVERRIDE(OP): Performed by: NURSE PRACTITIONER

## 2025-01-20 PROCEDURE — 700102 HCHG RX REV CODE 250 W/ 637 OVERRIDE(OP): Performed by: OBSTETRICS & GYNECOLOGY

## 2025-01-20 PROCEDURE — 770002 HCHG ROOM/CARE - OB PRIVATE (112)

## 2025-01-20 PROCEDURE — 700111 HCHG RX REV CODE 636 W/ 250 OVERRIDE (IP): Mod: JZ | Performed by: ANESTHESIOLOGY

## 2025-01-20 PROCEDURE — A9270 NON-COVERED ITEM OR SERVICE: HCPCS | Performed by: ANESTHESIOLOGY

## 2025-01-20 PROCEDURE — A9270 NON-COVERED ITEM OR SERVICE: HCPCS

## 2025-01-20 RX ORDER — LABETALOL 100 MG/1
100 TABLET, FILM COATED ORAL EVERY MORNING
Status: DISCONTINUED | OUTPATIENT
Start: 2025-01-21 | End: 2025-01-21 | Stop reason: HOSPADM

## 2025-01-20 RX ORDER — LABETALOL 100 MG/1
200 TABLET, FILM COATED ORAL EVERY EVENING
Status: DISCONTINUED | OUTPATIENT
Start: 2025-01-20 | End: 2025-01-21 | Stop reason: HOSPADM

## 2025-01-20 RX ADMIN — ACETAMINOPHEN 1000 MG: 500 TABLET ORAL at 04:27

## 2025-01-20 RX ADMIN — DOCUSATE SODIUM 100 MG: 100 CAPSULE, LIQUID FILLED ORAL at 09:07

## 2025-01-20 RX ADMIN — LABETALOL HYDROCHLORIDE 200 MG: 100 TABLET, FILM COATED ORAL at 18:14

## 2025-01-20 RX ADMIN — OXYCODONE 5 MG: 5 TABLET ORAL at 16:31

## 2025-01-20 RX ADMIN — PRENATAL WITH FERROUS FUM AND FOLIC ACID 1 TABLET: 3080; 920; 120; 400; 22; 1.84; 3; 20; 10; 1; 12; 200; 27; 25; 2 TABLET ORAL at 09:07

## 2025-01-20 RX ADMIN — IBUPROFEN 800 MG: 800 TABLET, FILM COATED ORAL at 16:31

## 2025-01-20 RX ADMIN — ACETAMINOPHEN 1000 MG: 500 TABLET ORAL at 18:15

## 2025-01-20 RX ADMIN — ACETAMINOPHEN 1000 MG: 500 TABLET ORAL at 11:36

## 2025-01-20 RX ADMIN — KETOROLAC TROMETHAMINE 15 MG: 15 INJECTION, SOLUTION INTRAMUSCULAR; INTRAVENOUS at 09:07

## 2025-01-20 RX ADMIN — KETOROLAC TROMETHAMINE 15 MG: 15 INJECTION, SOLUTION INTRAMUSCULAR; INTRAVENOUS at 01:18

## 2025-01-20 RX ADMIN — LABETALOL HYDROCHLORIDE 100 MG: 100 TABLET, FILM COATED ORAL at 05:53

## 2025-01-20 RX ADMIN — OXYCODONE HYDROCHLORIDE 10 MG: 10 TABLET ORAL at 21:30

## 2025-01-20 ASSESSMENT — EDINBURGH POSTNATAL DEPRESSION SCALE (EPDS)
THINGS HAVE BEEN GETTING ON TOP OF ME: NO, MOST OF THE TIME I HAVE COPED QUITE WELL
I HAVE FELT SAD OR MISERABLE: NOT VERY OFTEN
I HAVE BEEN SO UNHAPPY THAT I HAVE BEEN CRYING: NO, NEVER
I HAVE LOOKED FORWARD WITH ENJOYMENT TO THINGS: AS MUCH AS I EVER DID
I HAVE FELT SCARED OR PANICKY FOR NO GOOD REASON: YES, SOMETIMES
I HAVE BEEN ANXIOUS OR WORRIED FOR NO GOOD REASON: HARDLY EVER
THE THOUGHT OF HARMING MYSELF HAS OCCURRED TO ME: NEVER
I HAVE BEEN SO UNHAPPY THAT I HAVE HAD DIFFICULTY SLEEPING: NOT AT ALL
I HAVE BLAMED MYSELF UNNECESSARILY WHEN THINGS WENT WRONG: YES, SOME OF THE TIME
I HAVE BEEN ABLE TO LAUGH AND SEE THE FUNNY SIDE OF THINGS: AS MUCH AS I ALWAYS COULD

## 2025-01-20 ASSESSMENT — PAIN DESCRIPTION - PAIN TYPE
TYPE: ACUTE PAIN
TYPE: ACUTE PAIN
TYPE: SURGICAL PAIN
TYPE: ACUTE PAIN
TYPE: SURGICAL PAIN
TYPE: ACUTE PAIN

## 2025-01-20 NOTE — DISCHARGE PLANNING
Discharge Planning Assessment Post Partum    Late entry due to patient care    Reviewed record and met with parents of infant on 25    Reason for Referral: History of anxiety, depression, THC use  Address: [address] [facility] [address] in [place]  Type of Living Situation:[place]  Mom Diagnosis: post partum  Baby Diagnosis:   Primary Language: English    Name of Baby: Jamee Finnegan  Father of the Baby: Costa Marie  Involved in baby’s care? yes  Contact Information: [phone]    [facility]   Mom's PCP: none listed  PCP for new baby:Sandra Ruiz    Support System: family  Coping/Bonding between mother & baby: appropriate  Source of Feeding: breast  Supplies for Infant: prepared    Mom's Insurance: [employer]  Baby Covered on Insurance:yes  Mother Employed/School: none  Other children in the home/names & ages: first baby    Financial Hardship/Income: denies   Mom's Mental status: alert and oriented  Services used prior to admit: [employer] for online therapy    CPS History: no  Psychiatric History: anxiety/depression treated.  Domestic Violence History: no  Drug/ETOH History: THC per record. Infant UDS negative     Clearance for Discharge: Infant to discharge home to parents when ready.

## 2025-01-20 NOTE — PROGRESS NOTES
Obstetrics & Gynecology Post-Delivery Progress Note    Date of Service  2025    39 y.o.  1 s/p , Low Transverse  Delivery date: 2025  Breastfeeding: Yes    Events  No events    Subjective  Pain: No  Bleeding: lochia minimal  PO's: taking regular diet  Voiding: without difficulty  Ambulating: yes, denies weakness or dizziness  Feeding: breastfeeding well    Objective  Temp:  [35.8 °C (96.5 °F)-37 °C (98.6 °F)] 36.8 °C (98.2 °F)  Pulse:  [61-81] 69  Resp:  [16-18] 18  BP: (100-137)/(66-86) 130/77  SpO2:  [91 %-98 %] 97 %    Physical Exam  General: well and resting  Chest/Breasts: nipples intact and breasts soft  Fundus: firm, below umbilicus, and appropriately tender  Incision: dressing clean, dry, intact  Perineum: deferred  Extremities: symmetric and no edema    Lab Results   Component Value Date    RBC 3.71 (L) 2025    ABOGROUP O 2024    RH POS 2024     Immunization History   Administered Date(s) Administered    Covid-19 Mrna (Spikevax) Moderna 12+ Years 10/05/2023    Influenza Vac Subunit Quad Inj (Pf) 10/12/2020, 2021    Influenza split virus trivalent (PF) 2024    PFIZER BIVALENT SARS-COV-2 VACCINE (12+) 2022    PFIZER PURPLE CAP SARS-COV-2 VACCINATION (12+) 04/15/2021, 2021, 2021    Tdap Vaccine 2024       Assessment/Plan  Josephine Marie is a 39 y.o.  postop day 1 s/p , Low Transverse.    1. Post care: meeting all goals  2. Hemodynamics:  mild asymptomatic anemia  3. Pain: controlled  4. PNL:   Lab Results   Component Value Date    RH POS 2024    RUBELLAIGG 43.70 2024     5. Method of Feeding: plans to breastfeed  6. Method of Contraception: NA  7. Vaccinations:   Immunization History   Administered Date(s) Administered    Covid-19 Mrna (Spikevax) Moderna 12+ Years 10/05/2023    Influenza Vac Subunit Quad Inj (Pf) 10/12/2020, 2021    Influenza split virus trivalent (PF) 2024    PFIZER  BIVALENT SARS-COV-2 VACCINE (12+) 11/23/2022    PFIZER PURPLE CAP SARS-COV-2 VACCINATION (12+) 04/15/2021, 05/06/2021, 12/03/2021    Tdap Vaccine 11/14/2024     8. Disposition: likely home postop day  2-3    No new Assessment & Plan notes have been filed under this hospital service since the last note was generated.  Service: Obstetrics & Gynecology       VTE prophylaxis: pt is ambulatory    Philomena Guerrier C.N.M.

## 2025-01-20 NOTE — CARE PLAN
Problem: Knowledge Deficit - Postpartum  Goal: Patient will verbalize and demonstrate understanding of self and infant care  Outcome: Progressing  Note: Patient encouraged to ask questions regarding care. Informed consent obtained for all procedures, exams, and medication administrations.        Problem: Fall Risk  Goal: Patient will remain free from falls  Outcome: Progressing  Note: Patient encouraged to call for help when ambulating. This RN at bedside to watch patient ambulate to bathroom, steady gait demonstrated.    The patient is Stable - Low risk of patient condition declining or worsening    Shift Goals  Clinical Goals: Pain control  Patient Goals: Healthy baby  Family Goals: Support    Progress made toward(s) clinical / shift goals:      Patient is not progressing towards the following goals:

## 2025-01-21 ENCOUNTER — PHARMACY VISIT (OUTPATIENT)
Dept: PHARMACY | Facility: MEDICAL CENTER | Age: 40
End: 2025-01-21
Payer: COMMERCIAL

## 2025-01-21 VITALS
OXYGEN SATURATION: 95 % | HEART RATE: 75 BPM | DIASTOLIC BLOOD PRESSURE: 88 MMHG | HEIGHT: 62 IN | SYSTOLIC BLOOD PRESSURE: 133 MMHG | BODY MASS INDEX: 32.39 KG/M2 | WEIGHT: 176 LBS | TEMPERATURE: 97.6 F | RESPIRATION RATE: 18 BRPM

## 2025-01-21 PROCEDURE — RXMED WILLOW AMBULATORY MEDICATION CHARGE

## 2025-01-21 PROCEDURE — A9270 NON-COVERED ITEM OR SERVICE: HCPCS | Performed by: OBSTETRICS & GYNECOLOGY

## 2025-01-21 PROCEDURE — 700102 HCHG RX REV CODE 250 W/ 637 OVERRIDE(OP): Performed by: NURSE PRACTITIONER

## 2025-01-21 PROCEDURE — 700102 HCHG RX REV CODE 250 W/ 637 OVERRIDE(OP): Performed by: OBSTETRICS & GYNECOLOGY

## 2025-01-21 PROCEDURE — A9270 NON-COVERED ITEM OR SERVICE: HCPCS | Performed by: NURSE PRACTITIONER

## 2025-01-21 RX ORDER — OXYCODONE HYDROCHLORIDE 5 MG/1
5 TABLET ORAL EVERY 4 HOURS PRN
Qty: 15 TABLET | Refills: 0 | Status: SHIPPED | OUTPATIENT
Start: 2025-01-21 | End: 2025-01-26

## 2025-01-21 RX ORDER — SIMETHICONE 125 MG
125 TABLET,CHEWABLE ORAL 4 TIMES DAILY PRN
Qty: 120 TABLET | Refills: 0 | Status: SHIPPED | OUTPATIENT
Start: 2025-01-21

## 2025-01-21 RX ORDER — POLYETHYLENE GLYCOL 3350 17 G/17G
17 POWDER, FOR SOLUTION ORAL DAILY
Qty: 510 G | Refills: 0 | Status: SHIPPED | OUTPATIENT
Start: 2025-01-21

## 2025-01-21 RX ORDER — POLYETHYLENE GLYCOL 3350 17 G/17G
1 POWDER, FOR SOLUTION ORAL DAILY
Status: DISCONTINUED | OUTPATIENT
Start: 2025-01-21 | End: 2025-01-21 | Stop reason: HOSPADM

## 2025-01-21 RX ORDER — VITAMIN A ACETATE, BETA CAROTENE, ASCORBIC ACID, CHOLECALCIFEROL, .ALPHA.-TOCOPHEROL ACETATE, DL-, THIAMINE MONONITRATE, RIBOFLAVIN, NIACINAMIDE, PYRIDOXINE HYDROCHLORIDE, FOLIC ACID, CYANOCOBALAMIN, CALCIUM CARBONATE, FERROUS FUMARATE, ZINC OXIDE, CUPRIC OXIDE 3080; 12; 120; 400; 1; 1.84; 3; 20; 22; 920; 25; 200; 27; 10; 2 [IU]/1; UG/1; MG/1; [IU]/1; MG/1; MG/1; MG/1; MG/1; MG/1; [IU]/1; MG/1; MG/1; MG/1; MG/1; MG/1
1 TABLET, FILM COATED ORAL DAILY
Qty: 90 TABLET | Refills: 2 | Status: SHIPPED | OUTPATIENT
Start: 2025-01-21

## 2025-01-21 RX ORDER — PSEUDOEPHEDRINE HCL 30 MG
100 TABLET ORAL 2 TIMES DAILY PRN
Qty: 60 CAPSULE | Refills: 0 | Status: SHIPPED | OUTPATIENT
Start: 2025-01-21

## 2025-01-21 RX ORDER — LABETALOL 200 MG/1
200 TABLET, FILM COATED ORAL 2 TIMES DAILY
Qty: 90 TABLET | Refills: 3 | Status: SHIPPED | OUTPATIENT
Start: 2025-01-21

## 2025-01-21 RX ADMIN — LABETALOL HYDROCHLORIDE 100 MG: 100 TABLET, FILM COATED ORAL at 06:05

## 2025-01-21 RX ADMIN — ACETAMINOPHEN 1000 MG: 500 TABLET ORAL at 12:32

## 2025-01-21 RX ADMIN — IBUPROFEN 800 MG: 800 TABLET, FILM COATED ORAL at 01:55

## 2025-01-21 RX ADMIN — OXYCODONE HYDROCHLORIDE 10 MG: 10 TABLET ORAL at 08:08

## 2025-01-21 RX ADMIN — ACETAMINOPHEN 1000 MG: 500 TABLET ORAL at 00:14

## 2025-01-21 RX ADMIN — IBUPROFEN 800 MG: 800 TABLET, FILM COATED ORAL at 09:49

## 2025-01-21 RX ADMIN — ACETAMINOPHEN 1000 MG: 500 TABLET ORAL at 06:05

## 2025-01-21 RX ADMIN — PRENATAL WITH FERROUS FUM AND FOLIC ACID 1 TABLET: 3080; 920; 120; 400; 22; 1.84; 3; 20; 10; 1; 12; 200; 27; 25; 2 TABLET ORAL at 08:10

## 2025-01-21 RX ADMIN — OXYCODONE HYDROCHLORIDE 10 MG: 10 TABLET ORAL at 02:02

## 2025-01-21 ASSESSMENT — PAIN DESCRIPTION - PAIN TYPE
TYPE: SURGICAL PAIN
TYPE: ACUTE PAIN;SURGICAL PAIN
TYPE: SURGICAL PAIN
TYPE: ACUTE PAIN;SURGICAL PAIN
TYPE: SURGICAL PAIN
TYPE: ACUTE PAIN;SURGICAL PAIN
TYPE: ACUTE PAIN;SURGICAL PAIN
TYPE: SURGICAL PAIN
TYPE: SURGICAL PAIN

## 2025-01-21 NOTE — CARE PLAN
The patient is Stable - Low risk of patient condition declining or worsening    Shift Goals  Clinical Goals: fundus firm, lochia WDL  Patient Goals: Healthy baby  Family Goals: Support    Progress made toward(s) clinical / shift goals:    Problem: Psychosocial - Postpartum  Goal: Patient will verbalize and demonstrate effective bonding and parenting behavior  Outcome: Progressing  Note: MOB independently initiates infant care, breastfeeding, diaper changes. Regularly holds, swaddles infant.     Problem: Early Mobilization - Post Surgery  Goal: Early mobilization post surgery  Outcome: Progressing  Note: Pt ambulates independently within patient's room       Patient is not progressing towards the following goals:

## 2025-01-21 NOTE — PROGRESS NOTES
0840  Received report from ARI Fonseca at change of shift. Patient assessed and POC discussed. Patient is resting in bed. Fundus firm, lochia light.  Patient denies any needs at this time. Call light within reach, bed in lowest position. Patient is encouraged to call for pain/med interventions and any other needs.

## 2025-01-21 NOTE — LACTATION NOTE
This note was copied from a baby's chart.  Met with Josephine for a follow up lactation consultation.   She reports breast feeding is going well. She reports that her baby has been latching per her hunger cues with at least one feeding every three hours. She reports that she plans to offer her the breast closer to every two hours given her weight loss. She declined assistance with latch at this time. Breasts are soft, nipples are everted, small blister on the right side, which is improving per mom.     Reviewed signs of milk transfer. Watch for stool to transition to yellow/loose/seedy by day 5. Latch per hunger cues with at least one feeding every three hours. Anticipatory guidance provided regarding mature milk coming in, and encouraged exclusive breastfeeding for 2-4 weeks to allow baby to drive milk supply. Avoid pumping during this time unless medically indicated. Use gentle hand expression if needed during times of engorgement for relief, and cool packs to assist with inflammation.     Plan: Continue to feed baby on demand at least 8 times every 24hrs. Offer both breasts at each feeding. Frequent skin to skin. Do not limit baby's time at the breast. Reach out to RN/LC for assistance while inpatient. [facility] outpatient lactation consultant handout provided.

## 2025-01-21 NOTE — DISCHARGE INSTRUCTIONS
PATIENT DISCHARGE EDUCATION INSTRUCTION SHEET    REASONS TO CALL YOUR OBSTETRICIAN  Persistent fever, shaking, chills (Temperature higher than 100.4) may indicate you have an infection  Heavy bleeding: soaking more than 1 pad per hour; Passing clots an egg-sized clot or bigger may mean you have an postpartum hemorrhage  Foul odor from vagina or bad smelling or discolored discharge or blood  Breast infection (Mastitis symptoms); breast pain, chills, fever, redness or red streaks, may feel flu like symptoms  Urinary pain, burning or frequency  Incision that is not healing, increased redness, swelling, tenderness or pain, or any pus from episiotomy or  site may mean you have an infection  Redness, swelling, warmth, or painful to touch in the calf area of your leg may mean you have a blood clot  Severe or intensified depression, thoughts or feelings of wanting to hurt yourself or someone else   Pain in chest, obstructed breathing or shortness of breath (trouble catching your breath) may mean you are having a postpartum complication. Call your provider immediately   Headache that does not get better, even after taking medicine, a bad headache with vision changes or pain in the upper right area of your belly may mean you have high blood pressure or post birth preeclampsia. Call your provider immediately    HAND WASHING  All family and friends should wash their hands:  Before and after holding the baby  Before feeding the baby  After using the restroom or changing the baby's diaper    WOUND CARE  Ask your physician for additional care instructions. In general:   Incision:  May shower and pat incision dry   Keep the incision clean and dry  There should not be any opening or pus from the incision  Continue to walk at home 3 times a day   Do NOT lift anything heavier than your baby (over 10 pounds)  Encourage family to help participate in care of the  to allow rest and mom time to heal  VAGINAL CARE  "AND BLEEDING  Nothing inside vagina for 6 weeks:   No sexual intercourse, tampons or douching  Bleeding may continue for 2-4 weeks. Amount and color may vary  Soaking 1 pad or more in an hour for several hours is considered heavy bleeding  Passing large egg sized blood clots can be concerning  If you feel like you have heavy bleeding or are having increasing amount of blood clots call your Obstetrician immediately  If you begin feeling faint upon standing, feeling sick to your stomach, have clammy skin, a really fast heartbeat, have chills, start feeling confused, dizzy, sleepy or weak, or feeling like you're going to faint call your Obstetrician immediately    HYPERTENSION   Preeclampsia or gestational hypertension are types of high blood pressure that only pregnant women can get. It is important for you to be aware of symptoms to seek early intervention and treatment. If you have any of these symptoms immediately call your Obstetrician    Vision changes or blurred vision   Severe headache or pain that is unrelieved with medication and will not go away  Persistent pain in upper abdomen or shoulder   Increased swelling of face, feet, or hands  Difficulty breathing or shortness of breath at rest  Urinating less than usual    URINATION AND BOWEL MOVEMENTS  Eating more fiber (bran cereal, fruits, and vegetables) and drinking plenty of fluids will help to avoid constipation  Urinary frequency and urgency after childbirth is normal  If you experience any urinary pain, burning or frequency call your provider    BIRTH CONTROL  It is possible to become pregnant at any time after delivery and while breastfeeding  Plan to discuss a method of birth control with your physician at your post delivery follow up visit    POSTPARTUM BLUES  During the first few days after birth, you may experience a sense of the \"blues\" which may include impatience, irritability or even crying. These feelings come and go quickly. However, as many as " "1 in 10 women experience emotional symptoms known as postpartum depression.     POSTPARTUM DEPRESSION  May start as early as the second or third day after delivery or take several weeks or months to develop. Symptoms of \"blues\" are present, but are more intense: Crying spells; loss of appetite; feelings of hopelessness or loss of control; fear of touching the baby; over concern or no concern at all about the baby; little or no concern about your own appearance/caring for yourself; and/or inability to sleep or excessive sleeping. Contact your Obstetrician if you are experiencing any of these symptoms     PREVENTING SHAKEN BABY  If you are angry or stressed, PUT THE BABY IN THE CRIB, step away, take some deep breaths, and wait until you are calm to care for the baby. DO NOT SHAKE THE BABY. You are not alone, call a supporter for help.  [facility] 24/7 crisis call line ([phone]) or (1-109.240.3741)  You can also text them, text \"ANSWER\" ([id number])  "
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
## 2025-01-21 NOTE — PROGRESS NOTES
0703- Bedside report received from ARI [place].  Patient denied needs.  Patient stated she will call when ready for pain medication.  FOB at bedside.  0808- Discussed pain management plan, to include MD orders for scheduled and prn pain medication.   0925- Patient is speaking with the pediatrician at this time.  0949- Patient assessment done.  Patient reported she is voiding without difficulty and passing flatus.  Patient denied dizziness and reported that she is walking without difficulty.   Reviewed plan of care.  Patient verbalized understanding.  Patient stated desire for discharge home today and was encouraged to read the written patient education/instruction sheet.  Left message for Dr. Davila that patient desires discharge home today.  Awaiting call back.  1022- Per Dr. Davila, she will place discharge orders.  1115- Patient stated she read the written patient education/instruction sheet and has no questions.  1350- Dr. Davila notified to get clarification on discharge medications, aspirin and labetalol.  Per Dr. Davila, she will change orders in EPIC.  1420- Discharge instructions reviewed with patient who verbalized understanding and stated she has no questions.  Discharge paperwork signed by patient.  Patient received her discharge medications.  Reviewed discharge medications with patient who verbalized understanding.  1438- Patient stated that she is ready for discharge.  Patient discharged to home, no change noted in condition, via wheelchair with infant and FOB.

## 2025-01-21 NOTE — CARE PLAN
The patient is Stable - Low risk of patient condition declining or worsening    Shift Goals  Clinical Goals: Maintain fundus firm, lochia light; pain management  Patient Goals: pain management; thinking about discharge  Family Goals:     Progress made toward(s) clinical / shift goals:  MET

## 2025-01-21 NOTE — PROGRESS NOTES
1900 Bedside report received. Patient care assumed. Chart, prenatal labs, and orders reviewed  2015 Patient assessment complete and WDL.  Plan of care discussed including infant feeding every 2-3 hours or on demand, pain management, and ambulation. Pain medication plan discussed with patient; patient states she will call if PRN pain medication is wanted. All questions/concerns addressed at this time. Call light within reach, encouraged to call with needs.

## 2025-01-21 NOTE — CARE PLAN
The patient is Stable - Low risk of patient condition declining or worsening    Shift Goals  Clinical Goals: VS WDL; fundus and lochia WDL, pain level tolerable  Patient Goals: Healthy baby  Family Goals: Support    Progress made toward(s) clinical / shift goals:    Problem: Psychosocial - Postpartum  Goal: Patient will verbalize and demonstrate effective bonding and parenting behavior  Outcome: Progressing  Note: Patient verbalizes and demonstrates effective bonding and parenting behavior.     Problem: Altered Physiologic Condition  Goal: Patient physiologically stable as evidenced by normal lochia, palpable uterine involution and vitals within normal limits  Outcome: Progressing  Note: Patient is physiologically stable as evidenced by normal lochia, firm fundus, and vitals WDL.       Patient is not progressing towards the following goals:

## 2025-01-28 ENCOUNTER — GYNECOLOGY VISIT (OUTPATIENT)
Dept: OBGYN | Facility: CLINIC | Age: 40
End: 2025-01-28
Payer: COMMERCIAL

## 2025-01-28 ENCOUNTER — PHARMACY VISIT (OUTPATIENT)
Dept: PHARMACY | Facility: MEDICAL CENTER | Age: 40
End: 2025-01-28
Payer: COMMERCIAL

## 2025-01-28 VITALS — DIASTOLIC BLOOD PRESSURE: 97 MMHG | BODY MASS INDEX: 29.45 KG/M2 | WEIGHT: 161 LBS | SYSTOLIC BLOOD PRESSURE: 138 MMHG

## 2025-01-28 DIAGNOSIS — Z09 POSTOP CHECK: ICD-10-CM

## 2025-01-28 PROCEDURE — 99024 POSTOP FOLLOW-UP VISIT: CPT | Performed by: OBSTETRICS & GYNECOLOGY

## 2025-01-28 PROCEDURE — 3080F DIAST BP >= 90 MM HG: CPT | Performed by: OBSTETRICS & GYNECOLOGY

## 2025-01-28 PROCEDURE — 3075F SYST BP GE 130 - 139MM HG: CPT | Performed by: OBSTETRICS & GYNECOLOGY

## 2025-01-28 RX ORDER — OXYCODONE AND ACETAMINOPHEN 7.5; 325 MG/1; MG/1
1 TABLET ORAL EVERY 8 HOURS PRN
Qty: 15 TABLET | Refills: 0 | Status: SHIPPED | OUTPATIENT
Start: 2025-01-28 | End: 2025-02-04

## 2025-01-28 ASSESSMENT — FIBROSIS 4 INDEX: FIB4 SCORE: 1.16

## 2025-01-28 NOTE — PROGRESS NOTES
S/  No complaints  O/blood pressure 138/97 but patient states she was not taking her blood pressure meds she was sent home on labetalol 200 mg p.o. 3 times daily in addition patient states she is having some incisional discomfort and requests some more narcotic      Incision-healing well  IMP/ Normal Post-Op  PLAN/Follow up at 6 week Post partum   Percocet 7.5 mg tablets #15 written for pain we discussed that I cannot give her more than this and she should alternate Tylenol Motrin.  Patient states she will  take her labetalol as instructed

## 2025-02-06 ENCOUNTER — NON-PROVIDER VISIT (OUTPATIENT)
Dept: OBGYN | Facility: CLINIC | Age: 40
End: 2025-02-06
Payer: COMMERCIAL

## 2025-02-10 ENCOUNTER — NON-PROVIDER VISIT (OUTPATIENT)
Dept: OBGYN | Facility: CLINIC | Age: 40
End: 2025-02-10
Payer: COMMERCIAL

## 2025-02-10 DIAGNOSIS — O10.919 CHRONIC HYPERTENSION AFFECTING PREGNANCY: ICD-10-CM

## 2025-02-10 RX ORDER — LABETALOL 200 MG/1
200 TABLET, FILM COATED ORAL 2 TIMES DAILY
Qty: 90 TABLET | Refills: 3 | Status: SHIPPED | OUTPATIENT
Start: 2025-02-10

## 2025-02-10 NOTE — TELEPHONE ENCOUNTER
Received request via: Pharmacy    Was the patient seen in the last year in this department? No    Does the patient have an active prescription (recently filled or refills available) for medication(s) requested? No    Pharmacy Name: [facility] - [place], [place] - [address]     Does the patient have intermediate Plus and need 100-day supply? (This applies to ALL medications) Patient does not have SCP

## 2025-02-10 NOTE — PROGRESS NOTES
Summary: Breastfeeding every 2 hours during the day, up to 3 hours at night. Offering both breasts then topping off after most feedings, averaging 1-2oz. Pumping after feeding, averaging 5x, yielding .5-1.5oz between both breasts.   Today: Latched to both breasts, with the nipple shield. Jamee transferred 18mls from the left breast and 8mls from the left breast. Pumped with HGP, yielded 15mls total.   Plan: Feed Jamee every 2-2.5 hours during the day, up to 3 hours overnight. When breastfeeding, offer one or both sides, up to 10-15 minutes on each side. If taking one side, offer 1.5oz of breastmilk and/or formula. If offering both breasts, offer 1oz of breastmilk and/or formula. If not breastfeeding, offer 2.5oz of breastmilk or formula. Pump after or in place of most feedings. Recommended to continue using the hospital pump to protect milk supply.    Follow up:   Lactation appointment:  2025  Baby 's Provider appointment:  2 Month Well Check  Referrals: None    Subjective:     Josephine Marie is a 39 y.o. female here for lactation care. She is here today with her  baby, Jamee .    Concerns:   Maternal: Latch on difficulties , Feeling that there is not enough milk , Weight check, Infant feeding evaluation, and Breastfeeding questions   Infant: Sleepy baby and Slow weight gain     HPI:   Pertinent  history: c/section at 37.3 weeks    Mother does not have a history of insulin resistance, multiple gestation, thyroid disease, auto immune disease , placenta encapsulation, and breast surgery    Mother does have advanced maternal age, GDM, hypertension prior to pregnancy, GHTN, and PCOS. Common condition(s) that may interfere in milk supply.    Breast changes in pregnancy: Yes  Breast surgeries: No    FEEDING HISTORY:    Previous Breastfeeding History: First baby.   Prior to consultation on 2025: Breastfeeding every 2 hours during the day, up to 3 hours overnight. Had been offering one  breast for most feedings. Recently started offering both sides. Reports that Jamee averages about 8-10 minutes on one breast. Started supplementing yesterday, 2/5/2025.    Currently 2/10/2025: Breastfeeding every 2 hours during the day, up to 3 hours at night. Offering both breasts then topping off after most feedings, averaging 1-2oz. Pumping after feeding, averaging 5x, yielding .5-1.5oz between both breasts.     Both breasts: Yes    Supplement: Expressed breast milk and Formula  Quantity: 1-2oz    Breast Pumping:  Frequency: 5x  Type of Pump:  HGP and Baby Buddha     Maternal ROS:  Constitutional: No fever, chills. Feeling well  Breasts: No soreness of breasts and No soreness of nipples   Psychiatric: Experiencing anxiety  Mental Health: No mention of feeling irritable, agitated, angry, overwhelmed, apathetic, appetite changes, exhausted nor having sleep changes outside infant feeds/demands.  Current Outpatient Medications on File Prior to Visit   Medication Sig Dispense Refill    Lancets 1 Units 4 times a day. Lancets order: Lancets for Abbott [place] Lite meter. Sig: use 4 times daily and prn ssx high or low sugar. #100 RF x 3 100 Each 8    labetalol (NORMODYNE) 200 MG Tab Take 1 Tablet by mouth 2 times a day. May take 1/2 a tab in the morning 90 Tablet 3    docusate sodium 100 MG Cap Take 1 capsule by mouth 2 times a day as needed for Constipation. 60 Capsule 0    polyethylene glycol 3350 (MIRALAX) 17 GM/SCOOP Powder Mix 17 gm per package instructions and drink by mouth every day. 510 g 0    prenatal plus vitamin (STUARTNATAL 1+1) 27-1 MG Tab tablet Take 1 Tablet by mouth every day. 90 Tablet 2    simethicone (MYLICON) 125 MG chewable tablet Chew 1 Tablet 4 times a day as needed for Flatulence. 120 Tablet 0    Continuous Glucose Monitor Sup Misc 1 Applicator 4 times a day. 4 Each 10    Continuous Glucose Transmitter Misc 1 Units four times daily - before meals and nightly as needed (4 times). 1 Units 0     Prenatal MV-Min-Fe Fum-FA-DHA (PRENATAL 1 PO) Take  by mouth.       No current facility-administered medications on file prior to visit.      No past medical history on file.      Objective:     Maternal Physical Lactation Exam  General: No acute distress  Breasts: Symmetrical  and Soft  Nipples: intact  Psychiatric: Normal mood and affect. Her behavior is normal. Judgment and thought content normal.  Mental Health: Did NOT exhibit sadness, crying, feeling overwhelmed, agitation or hypervigilance.    Assessment/Plan & Lactation Counseling:     Infant Weight History:   01/19/2025: 6# 7.2oz  02/03/2025: 6# 0.5oz  02/06/2025: 6# 1.6oz  02/10/2025: 6# 6.1oz    Total Infant Intake at Breast: 26mls  Milk Transfer at this feeding:   Effective breastfeeding for volume available     Pumping Information:    Type of Pump:  HGP     Total Quantity Pumped: 15mls  Initiation of Feeding: Infant initiates  Attachment Achieved: rapidly  Nipple shield: N/A       Difficult Latch Due To:   Sleepy and Jaundice   Suck Pattern at the Breast: Suck burst and normal rest  Suck Pattern on the Bottle: Not Indicated     Behavior Following Observed Feeding: content  Nipple Pain: None        Latch: Assisted latch  Suckling/Feeding: attaches, audible swallows, baby falls asleep, baby fed effectively, baby roots, elicits JAY, intermittent swallows, and rhythmic    Milk Supply Available: normal and delayed    Low Milk Supply:   Likely due to: maternal medical history, delay in lactogenesis II, and ineffective or infrequent breast stimulation or milk removal      MATERNAL PERSONALIZED BREASTFEEDING PLAN  Topics advised, taught and or reviewed included:   Feeding:   Infant difficulty with feeding, slow growth. Guidelines to follow:  Feed your baby every 1.5-3 hours, more often if baby acts hungry.   8-10x/24hours, these will be irregular intervals  Responsive feedings - baby cues and parents respond  Awaken baby for feeding if going over 3 hours in the  day.   Until back to birth weight, ONE four hour at night is acceptable if has had 8 prior feedings in 24 hours.    Supplement:   Supplement with expressed milk/formula   Offer 1oz if Jamee takes both breasts.  Offer 1.5oz if Jamee only takes one breast.  Offer 2.5oz if not breastfeeding.   Formula  Proper powder formula preparation: https://www.cdc.gov/nutrition/downloads/prepare-store-powered-mkkwmf-vwubatd-261.pdf.   For babies under 2 months: https://www.cdc.gov/cronobacter/infection-and-infants.html  Pumping Guidelines:  Both breasts 8x in 24 hours  Bra    Consider a hands free bra - Simple Wishes is recommended.  Cake Maternity or Milkmaid for larger size bras  Type of Pump:  Ameda  Ameda Cary Settings http://www.[employer]/healthcare-professionals/videos/Democravise-platinum-with-hygienikit-video   Speed: Start at 80 then turn down to 60 after 1.5-2 minutes when you see milk in the flange channel  Suction: To comfort, goal of  30-50%. NEVER to discomfort.   Power Pumping is rarely indicated as the response is not significant or zero over 24 hours  How long will vary woman to woman, typically 8-15 minutes, or 1-2 minutes after flow slows. Too long is dry pumping and creates inflammation  Flange Fit: Freely moving nipple in the tunnel with some movement of the areola. Careful using lubricant it will disguise pain if you increase suction  Today's evaluation indicates appropriate flange  Caution with Breast Massage: The word “Massage” means different things in the breastfeeding world. It is described and interpreted in so many different ways and unfortunately potentially harmful. The hands can be safe on a breast and  gentle to help in many ways but they also can be damaging when used in the wrong way.  Lymphatic drainage massage is appropriate,  open hand , lightly stroking only, and can be highly effective. The massage advice to knead , massage deeply , vibrate with marketed tools is  most concerning. Be gentle  with this gland to not increase inflammation.   Storage (Acceptable guidelines for healthy term babies)  10 hours at room temp including your pieces, may rinse but not mandatory  8 days refrigerator, don't need  to refrigerate right away if using fresh pumped milk at the next feeding  Freezer 1 year  Deep freezer 2 years  American Academy or Pediatrics has said you may mix different temperature milks.   If baby drinks breast milk from a fresh or refrigerated bottle of breast milk,  you may return the unused portion to the refrigerator and use once at next feeding.   Increasing supply besides Galactogogue's and Pumping:  Warmth  Relaxation   Physical, auditory narratives  Childbirth relaxation Techniques  Acupuncture and acupressure  Sigifredo Louis at [facility] Acupuncture for chinese herbs for low supply  Shoulder Massages  Take a nap  Breast Care: Engorgement, Clogs/Plugs and/or Mastitis  Breast rest - No Massage, don't overfeed of over pump, down regulate production if needed  Ice - 10 minutes  after feeding then every 30 minutes or as desired for repeating the ice. Don't put the ice directly on the skin.  Advil 800mg every 8 hours for 48 hours with food for pain  May add Tylenol 1000mg every 8 hours for 48 hours in between the Advil dosing if needed for more pain relief.  Answers to FAQs: https://www.infantrisk.com/category/breastfeeding  Alcohol & Breastfeeding: What's your time-to-zero?  Cough & Cold Medications while Breastfeeding  Vitamin D Supplementation and Breastfeeding  Antidepressant Use While Breastfeeding: What should I know?  Breastfeeding, Caffeine, and Energy Drinks  Recommended resources:  Physicians guide to breastfeeding, must up to date and accurate information available on breastfeeding. https://physicianguidetobreastfeeding.org/  Dads  Step #1 (for Partners): If possible, arrange your life so you can engage with your baby early and often                                                                                  Step #2 (for Moms): Encourage your partner to be hands-on - and let him handle things differently.                                                                                            Step #3 (for Partners): Realize that “your way” of parenting is essential for your child. https://doi.org/10.1093/cercor/dkwh288    Connect with other mothers:  Facebook:   [place] Breastfeeds: https://www."[employer].com/[place].breastfeeds/  Well-Nourished Babies (Private group for questions and support): https://www.facebook.com/groups/611134753209519/  Breastfeeding [place] including opportunity to weight your baby and do before and after weights   Tuesdays 10am - 11am. Women's Health at [address] [place] [address], [facility] [address] [place] [address], 3rd floor conference room  Check your baby's weight, do a feeding and see how your baby is growing, visit with other mothers, plan on a walk or coffee date after group.  Please download Growth: Baby and Child for Apple or Child Growth Tracker for [employer] if you would like to  document and follow your baby's growth curve.  Due to space limitations - limit strollers please (New c/section moms please use your stroller).  We would love to have dads stay, but moms won't breastfeed if there are men in the room, sorry.  The room is generally scheduled for another event following group.  Please take all diapers with you   PSI ONLINE SUPPORT GROUPS  [employer] (PSI) support groups are conducted using a prit-hv-ybha support model and are not intended for those experiencing a mental health crisis. Groups are 90 minutes (1.5 hours) in length. The first ~30 minutes is providing information, education, and establishing group guidelines. The next ~60 minutes is “talk time,” in which group members share and talk with each other. Group members must be present for the group guidelines before joining in the discussion or “talk time.”         Follow up   Please call [phone] our voicemail line or  the front office at [phone] to scheduled your next appointment.    A total of 50 minutes, including infant assessment time,  was spent preparing to see the patient, obtaining and reviewing separately obtained history, performing a medically appropriate examination and evaluation, counseling and educating the family, referring and communicating with other health care professionals, documenting clinical information in the electronic health record, independently interpreting weighted feeds and infant growth results, communicating these results to the family and care coordination as detailed in the above note.       Jasmin Monreal

## 2025-02-20 ENCOUNTER — NON-PROVIDER VISIT (OUTPATIENT)
Dept: OBGYN | Facility: CLINIC | Age: 40
End: 2025-02-20
Payer: COMMERCIAL

## 2025-03-03 ASSESSMENT — EDINBURGH POSTNATAL DEPRESSION SCALE (EPDS)
I HAVE BEEN ANXIOUS OR WORRIED FOR NO GOOD REASON: HARDLY EVER
I HAVE LOOKED FORWARD WITH ENJOYMENT TO THINGS: AS MUCH AS I EVER DID
I HAVE BEEN ABLE TO LAUGH AND SEE THE FUNNY SIDE OF THINGS: AS MUCH AS I ALWAYS COULD
I HAVE BLAMED MYSELF UNNECESSARILY WHEN THINGS WENT WRONG: NO, NEVER
I HAVE FELT SCARED OR PANICKY FOR NO GOOD REASON: NO, NOT AT ALL

## 2025-03-04 ENCOUNTER — NON-PROVIDER VISIT (OUTPATIENT)
Dept: OBGYN | Facility: CLINIC | Age: 40
End: 2025-03-04
Payer: COMMERCIAL

## 2025-03-06 ENCOUNTER — POST PARTUM (OUTPATIENT)
Dept: OBGYN | Facility: CLINIC | Age: 40
End: 2025-03-06
Payer: COMMERCIAL

## 2025-03-06 VITALS — BODY MASS INDEX: 29.26 KG/M2 | WEIGHT: 160 LBS | DIASTOLIC BLOOD PRESSURE: 97 MMHG | SYSTOLIC BLOOD PRESSURE: 141 MMHG

## 2025-03-06 PROCEDURE — 3080F DIAST BP >= 90 MM HG: CPT | Performed by: OBSTETRICS & GYNECOLOGY

## 2025-03-06 PROCEDURE — 3077F SYST BP >= 140 MM HG: CPT | Performed by: OBSTETRICS & GYNECOLOGY

## 2025-03-06 PROCEDURE — 0503F POSTPARTUM CARE VISIT: CPT | Performed by: OBSTETRICS & GYNECOLOGY

## 2025-03-06 ASSESSMENT — EDINBURGH POSTNATAL DEPRESSION SCALE (EPDS)
I HAVE FELT SCARED OR PANICKY FOR NO GOOD REASON: NO, NOT MUCH
THINGS HAVE BEEN GETTING ON TOP OF ME: NO, MOST OF THE TIME I HAVE COPED QUITE WELL
I HAVE BEEN ANXIOUS OR WORRIED FOR NO GOOD REASON: NO, NOT AT ALL
I HAVE FELT SAD OR MISERABLE: NOT VERY OFTEN
THE THOUGHT OF HARMING MYSELF HAS OCCURRED TO ME: NEVER
I HAVE BEEN SO UNHAPPY THAT I HAVE HAD DIFFICULTY SLEEPING: NOT AT ALL
I HAVE LOOKED FORWARD WITH ENJOYMENT TO THINGS: AS MUCH AS I EVER DID
I HAVE BEEN ABLE TO LAUGH AND SEE THE FUNNY SIDE OF THINGS: AS MUCH AS I ALWAYS COULD
TOTAL SCORE: 6
I HAVE BEEN SO UNHAPPY THAT I HAVE BEEN CRYING: NO, NEVER
I HAVE BLAMED MYSELF UNNECESSARILY WHEN THINGS WENT WRONG: YES, MOST OF THE TIME

## 2025-03-06 ASSESSMENT — FIBROSIS 4 INDEX: FIB4 SCORE: 1.16

## 2025-03-06 NOTE — PROGRESS NOTES
Postpartum;    Josephine Marie is 39 y.o. female  status post primary  section, doing well today. Currently nursing without difficulty    Physical examination;    Breast examination-no dominant masses, no skin retraction, no axillary adenopathy, no evidence of mastitis    Pelvic examination;  External genitalia-no visible lesions  Vagina-no discharge or blood  Cervix-no gross lesions  Uterus-normal size and shape, nontender  Adnexa without mass or tenderness     Abdomen-nondistended positive bowel sounds soft nontender without masses or hepatosplenomegaly      Impression;  Normal postpartum    Plan;  Birth control condoms  Annual-1 year  We discussed that she should wait 1 year if she tries to conceive again

## 2025-03-24 ENCOUNTER — NON-PROVIDER VISIT (OUTPATIENT)
Dept: OBGYN | Facility: CLINIC | Age: 40
End: 2025-03-24
Payer: COMMERCIAL

## 2025-05-23 ENCOUNTER — TELEPHONE (OUTPATIENT)
Dept: HEALTH INFORMATION MANAGEMENT | Facility: OTHER | Age: 40
End: 2025-05-23
Payer: COMMERCIAL

## 2025-06-19 ENCOUNTER — HOSPITAL ENCOUNTER (OUTPATIENT)
Dept: RADIOLOGY | Facility: MEDICAL CENTER | Age: 40
End: 2025-06-19
Attending: OBSTETRICS & GYNECOLOGY
Payer: COMMERCIAL

## 2025-06-19 PROCEDURE — 77063 BREAST TOMOSYNTHESIS BI: CPT

## 2025-07-17 RX ORDER — LACTIC ACID, L-, CITRIC ACID MONOHYDRATE, AND POTASSIUM BITARTRATE 90; 50; 20 MG/5G; MG/5G; MG/5G
1 GEL VAGINAL PRN
Qty: 60 G | Refills: 10 | Status: SHIPPED | OUTPATIENT
Start: 2025-07-17

## 2025-08-08 ENCOUNTER — OFFICE VISIT (OUTPATIENT)
Dept: MEDICAL GROUP | Facility: PHYSICIAN GROUP | Age: 40
End: 2025-08-08
Payer: COMMERCIAL

## 2025-08-08 VITALS
BODY MASS INDEX: 29.52 KG/M2 | HEIGHT: 62 IN | HEART RATE: 98 BPM | TEMPERATURE: 97.8 F | SYSTOLIC BLOOD PRESSURE: 130 MMHG | WEIGHT: 160.4 LBS | DIASTOLIC BLOOD PRESSURE: 90 MMHG | OXYGEN SATURATION: 98 %

## 2025-08-08 DIAGNOSIS — R53.83 OTHER FATIGUE: ICD-10-CM

## 2025-08-08 DIAGNOSIS — K64.4 HEMORRHOIDAL SKIN TAG: ICD-10-CM

## 2025-08-08 DIAGNOSIS — Z00.00 PREVENTATIVE HEALTH CARE: ICD-10-CM

## 2025-08-08 DIAGNOSIS — R59.0 CERVICAL LYMPHADENOPATHY: ICD-10-CM

## 2025-08-08 DIAGNOSIS — R42 ORTHOSTATIC DIZZINESS: ICD-10-CM

## 2025-08-08 PROCEDURE — 3078F DIAST BP <80 MM HG: CPT | Performed by: STUDENT IN AN ORGANIZED HEALTH CARE EDUCATION/TRAINING PROGRAM

## 2025-08-08 PROCEDURE — 99214 OFFICE O/P EST MOD 30 MIN: CPT | Performed by: STUDENT IN AN ORGANIZED HEALTH CARE EDUCATION/TRAINING PROGRAM

## 2025-08-08 PROCEDURE — 3075F SYST BP GE 130 - 139MM HG: CPT | Performed by: STUDENT IN AN ORGANIZED HEALTH CARE EDUCATION/TRAINING PROGRAM

## 2025-08-08 ASSESSMENT — ENCOUNTER SYMPTOMS
HEADACHES: 0
CHILLS: 0
SHORTNESS OF BREATH: 0
FEVER: 0
DIZZINESS: 0

## 2025-08-08 ASSESSMENT — FIBROSIS 4 INDEX: FIB4 SCORE: 1.19

## 2025-08-12 ENCOUNTER — HOSPITAL ENCOUNTER (OUTPATIENT)
Dept: LAB | Facility: MEDICAL CENTER | Age: 40
End: 2025-08-12
Attending: STUDENT IN AN ORGANIZED HEALTH CARE EDUCATION/TRAINING PROGRAM
Payer: COMMERCIAL

## 2025-08-12 DIAGNOSIS — Z00.00 PREVENTATIVE HEALTH CARE: ICD-10-CM

## 2025-08-12 DIAGNOSIS — R53.83 OTHER FATIGUE: ICD-10-CM

## 2025-08-12 LAB
25(OH)D3 SERPL-MCNC: 35 NG/ML (ref 30–100)
ALBUMIN SERPL BCP-MCNC: 4.4 G/DL (ref 3.2–4.9)
ALBUMIN/GLOB SERPL: 1.5 G/DL
ALP SERPL-CCNC: 74 U/L (ref 30–99)
ALT SERPL-CCNC: 17 U/L (ref 2–50)
ANION GAP SERPL CALC-SCNC: 11 MMOL/L (ref 7–16)
AST SERPL-CCNC: 20 U/L (ref 12–45)
BILIRUB SERPL-MCNC: 0.3 MG/DL (ref 0.1–1.5)
BUN SERPL-MCNC: 11 MG/DL (ref 8–22)
CALCIUM ALBUM COR SERPL-MCNC: 9.6 MG/DL (ref 8.5–10.5)
CALCIUM SERPL-MCNC: 9.9 MG/DL (ref 8.5–10.5)
CHLORIDE SERPL-SCNC: 108 MMOL/L (ref 96–112)
CHOLEST SERPL-MCNC: 184 MG/DL (ref 100–199)
CO2 SERPL-SCNC: 24 MMOL/L (ref 20–33)
CREAT SERPL-MCNC: 0.76 MG/DL (ref 0.5–1.4)
ERYTHROCYTE [DISTWIDTH] IN BLOOD BY AUTOMATED COUNT: 43.4 FL (ref 35.9–50)
EST. AVERAGE GLUCOSE BLD GHB EST-MCNC: 108 MG/DL
FASTING STATUS PATIENT QL REPORTED: NORMAL
FOLATE SERPL-MCNC: 20.7 NG/ML
GFR SERPLBLD CREATININE-BSD FMLA CKD-EPI: 101 ML/MIN/1.73 M 2
GLOBULIN SER CALC-MCNC: 2.9 G/DL (ref 1.9–3.5)
GLUCOSE SERPL-MCNC: 80 MG/DL (ref 65–99)
HBA1C MFR BLD: 5.4 % (ref 4–5.6)
HCT VFR BLD AUTO: 46.8 % (ref 37–47)
HDLC SERPL-MCNC: 47 MG/DL
HGB BLD-MCNC: 15.1 G/DL (ref 12–16)
LDLC SERPL CALC-MCNC: 120 MG/DL
MCH RBC QN AUTO: 29.2 PG (ref 27–33)
MCHC RBC AUTO-ENTMCNC: 32.3 G/DL (ref 32.2–35.5)
MCV RBC AUTO: 90.5 FL (ref 81.4–97.8)
PLATELET # BLD AUTO: 340 K/UL (ref 164–446)
PMV BLD AUTO: 10.7 FL (ref 9–12.9)
POTASSIUM SERPL-SCNC: 4.5 MMOL/L (ref 3.6–5.5)
PROT SERPL-MCNC: 7.3 G/DL (ref 6–8.2)
RBC # BLD AUTO: 5.17 M/UL (ref 4.2–5.4)
SODIUM SERPL-SCNC: 143 MMOL/L (ref 135–145)
TRIGL SERPL-MCNC: 83 MG/DL (ref 0–149)
TSH SERPL DL<=0.005 MIU/L-ACNC: 2.45 UIU/ML (ref 0.38–5.33)
VIT B12 SERPL-MCNC: 765 PG/ML (ref 211–911)
WBC # BLD AUTO: 7.9 K/UL (ref 4.8–10.8)

## 2025-08-12 PROCEDURE — 84443 ASSAY THYROID STIM HORMONE: CPT

## 2025-08-12 PROCEDURE — 80053 COMPREHEN METABOLIC PANEL: CPT

## 2025-08-12 PROCEDURE — 80061 LIPID PANEL: CPT

## 2025-08-12 PROCEDURE — 85027 COMPLETE CBC AUTOMATED: CPT

## 2025-08-12 PROCEDURE — 82746 ASSAY OF FOLIC ACID SERUM: CPT

## 2025-08-12 PROCEDURE — 82607 VITAMIN B-12: CPT

## 2025-08-12 PROCEDURE — 36415 COLL VENOUS BLD VENIPUNCTURE: CPT

## 2025-08-12 PROCEDURE — 82306 VITAMIN D 25 HYDROXY: CPT

## 2025-08-12 PROCEDURE — 83036 HEMOGLOBIN GLYCOSYLATED A1C: CPT

## 2025-09-08 ENCOUNTER — APPOINTMENT (OUTPATIENT)
Dept: RADIOLOGY | Facility: MEDICAL CENTER | Age: 40
End: 2025-09-08
Attending: STUDENT IN AN ORGANIZED HEALTH CARE EDUCATION/TRAINING PROGRAM
Payer: COMMERCIAL

## (undated) DEVICE — GLOVE BIOGEL SZ 6 PF LATEX - (50EA/BX 4BX/CA)

## (undated) DEVICE — RETRACTOR O C SECTION LRY - (5/BX)

## (undated) DEVICE — CANISTER SUCTION RIGID RED 1500CC (40EA/CA)

## (undated) DEVICE — SUTURE 2-0 VICRYL PLUS CT-1 36 (36PK/BX)"

## (undated) DEVICE — PENCIL ELECTSURG 10FT HLSTR - WITH BLADE (50EA/CA)

## (undated) DEVICE — PAD LAP STERILE 18 X 18 - (5/PK 40PK/CA)

## (undated) DEVICE — TRAY SPINAL ANESTHESIA NON-SAFETY (10/CA)

## (undated) DEVICE — KIT  I.V. START (100EA/CA)

## (undated) DEVICE — KIT SKIN NOSE AND MOUTH PRE-OP (20/CA)

## (undated) DEVICE — PACK C-SECTION (2EA/CA)

## (undated) DEVICE — SPONGE SURGICAL PVP IODINE L8 IN (20EA/CA)

## (undated) DEVICE — MAT PATIENT POSITIONING PREVALON (10EA/CA)

## (undated) DEVICE — PACK ROOM TURNOVER L&D (12/CA)

## (undated) DEVICE — SUTURE 4-0 MONOCRYL PLUS PS-1 - 27 INCH (36/BX)

## (undated) DEVICE — TUBING CLEARLINK DUO-VENT - C-FLO (48EA/CA)

## (undated) DEVICE — CHLORAPREP 26 ML APPLICATOR - ORANGE TINT(25/CA)

## (undated) DEVICE — WATER IRRIGATION STERILE 1000ML (12EA/CA)

## (undated) DEVICE — CATHETER IV NON-SAFETY 18 GA X 1 1/4 (50/BX 4BX/CA)

## (undated) DEVICE — SLEEVE VASO DVT COMPRESSION CALF MED - (10PR/CA)

## (undated) DEVICE — HEAD HOLDER JUNIOR/ADULT

## (undated) DEVICE — CANISTER SUCTION 3000ML MECHANICAL FILTER AUTO SHUTOFF MEDI-VAC NONSTERILE LF DISP (40EA/CA)

## (undated) DEVICE — SUTURE 1 VICRYL PLUS CTX - 36 INCH (36/BX)

## (undated) DEVICE — DRESSING POST OP BORDER 4 X 10 (5EA/BX)

## (undated) DEVICE — SET EXTENSION WITH 2 PORTS (48EA/CA) ***PART #2C8610 IS A SUBSTITUTE*****

## (undated) DEVICE — BAG SPONGE COUNT 10.25 X 32 - BLUE (250/CA)

## (undated) DEVICE — SODIUM CHL IRRIGATION 0.9% 1000ML (12EA/CA)

## (undated) DEVICE — BLANKET STERILE CHICKIE FOR L&D (100/CA)

## (undated) DEVICE — BLANKET UNDERBODY ADULT - (10/CA)

## (undated) DEVICE — SUTURE 0 VICRYL PLUS CT-1 - 36 INCH (36/BX)

## (undated) DEVICE — CANNULA O2 COMFORT SOFT EAR ADULT 7 FT TUBING (50/CA)

## (undated) DEVICE — GLOVE BIOGEL INDICATOR SZ 6.5 SURGICAL PF LTX - (50PR/BX 4BX/CA)

## (undated) DEVICE — SOLUTION PLASMA-LYTE PH 7.4 INJ 1000ML (14EA/CA)